# Patient Record
Sex: FEMALE | Race: WHITE | NOT HISPANIC OR LATINO | Employment: PART TIME | URBAN - METROPOLITAN AREA
[De-identification: names, ages, dates, MRNs, and addresses within clinical notes are randomized per-mention and may not be internally consistent; named-entity substitution may affect disease eponyms.]

---

## 2017-02-16 ENCOUNTER — TELEPHONE (OUTPATIENT)
Dept: FAMILY MEDICINE | Age: 57
End: 2017-02-16

## 2017-02-24 ENCOUNTER — TELEPHONE (OUTPATIENT)
Dept: FAMILY MEDICINE | Age: 57
End: 2017-02-24

## 2017-02-27 ENCOUNTER — OFFICE VISIT (OUTPATIENT)
Dept: OBGYN | Age: 57
End: 2017-02-27

## 2017-02-27 ENCOUNTER — OFF PREMISE (OUTPATIENT)
Dept: OTHER | Age: 57
End: 2017-02-27

## 2017-02-27 VITALS
WEIGHT: 125 LBS | SYSTOLIC BLOOD PRESSURE: 88 MMHG | BODY MASS INDEX: 21.34 KG/M2 | DIASTOLIC BLOOD PRESSURE: 52 MMHG | HEIGHT: 64 IN

## 2017-02-27 DIAGNOSIS — Z01.419 WELL WOMAN EXAM WITH ROUTINE GYNECOLOGICAL EXAM: Primary | ICD-10-CM

## 2017-02-27 PROCEDURE — 99396 PREV VISIT EST AGE 40-64: CPT | Performed by: OBSTETRICS & GYNECOLOGY

## 2017-03-09 ENCOUNTER — OFFICE VISIT (OUTPATIENT)
Dept: NEUROLOGY | Age: 57
End: 2017-03-09

## 2017-03-09 VITALS
DIASTOLIC BLOOD PRESSURE: 60 MMHG | WEIGHT: 119.05 LBS | HEART RATE: 72 BPM | SYSTOLIC BLOOD PRESSURE: 100 MMHG | HEIGHT: 63 IN | BODY MASS INDEX: 21.09 KG/M2

## 2017-03-09 DIAGNOSIS — G25.0 BENIGN ESSENTIAL TREMOR: Primary | ICD-10-CM

## 2017-03-09 DIAGNOSIS — R25.1 TREMORS OF NERVOUS SYSTEM: ICD-10-CM

## 2017-03-09 PROCEDURE — 96127 BRIEF EMOTIONAL/BEHAV ASSMT: CPT | Performed by: PSYCHIATRY & NEUROLOGY

## 2017-03-09 PROCEDURE — 99245 OFF/OP CONSLTJ NEW/EST HI 55: CPT | Performed by: PSYCHIATRY & NEUROLOGY

## 2018-03-05 ENCOUNTER — LAB SERVICES (OUTPATIENT)
Dept: LAB | Age: 58
End: 2018-03-05

## 2018-03-05 ENCOUNTER — OFF PREMISE (OUTPATIENT)
Dept: OTHER | Age: 58
End: 2018-03-05

## 2018-03-05 ENCOUNTER — OFFICE VISIT (OUTPATIENT)
Dept: OBGYN | Age: 58
End: 2018-03-05

## 2018-03-05 VITALS
SYSTOLIC BLOOD PRESSURE: 100 MMHG | WEIGHT: 124.12 LBS | HEIGHT: 63 IN | BODY MASS INDEX: 21.99 KG/M2 | DIASTOLIC BLOOD PRESSURE: 74 MMHG

## 2018-03-05 DIAGNOSIS — Z01.419 WELL WOMAN EXAM WITH ROUTINE GYNECOLOGICAL EXAM: Primary | ICD-10-CM

## 2018-03-05 DIAGNOSIS — Z11.59 ENCOUNTER FOR HEPATITIS C SCREENING TEST FOR LOW RISK PATIENT: ICD-10-CM

## 2018-03-05 DIAGNOSIS — Z12.31 ENCOUNTER FOR SCREENING MAMMOGRAM FOR MALIGNANT NEOPLASM OF BREAST: ICD-10-CM

## 2018-03-05 LAB — HCV AB SER QL: NEGATIVE

## 2018-03-05 PROCEDURE — 86803 HEPATITIS C AB TEST: CPT | Performed by: INTERNAL MEDICINE

## 2018-03-05 PROCEDURE — 99396 PREV VISIT EST AGE 40-64: CPT | Performed by: OBSTETRICS & GYNECOLOGY

## 2018-03-05 PROCEDURE — 36415 COLL VENOUS BLD VENIPUNCTURE: CPT | Performed by: INTERNAL MEDICINE

## 2018-03-06 ENCOUNTER — TELEPHONE (OUTPATIENT)
Dept: OBGYN | Age: 58
End: 2018-03-06

## 2018-04-05 ENCOUNTER — TELEPHONE (OUTPATIENT)
Dept: FAMILY MEDICINE | Age: 58
End: 2018-04-05

## 2019-01-01 ENCOUNTER — EXTERNAL RECORD (OUTPATIENT)
Dept: OTHER | Age: 59
End: 2019-01-01

## 2019-03-05 ENCOUNTER — NURSE TRIAGE (OUTPATIENT)
Dept: FAMILY MEDICINE | Age: 59
End: 2019-03-05

## 2019-04-18 ENCOUNTER — OFFICE VISIT (OUTPATIENT)
Dept: OBGYN | Age: 59
End: 2019-04-18

## 2019-04-18 VITALS
SYSTOLIC BLOOD PRESSURE: 102 MMHG | BODY MASS INDEX: 21.57 KG/M2 | DIASTOLIC BLOOD PRESSURE: 58 MMHG | WEIGHT: 126.32 LBS | HEIGHT: 64 IN

## 2019-04-18 DIAGNOSIS — Z01.419 WELL WOMAN EXAM WITH ROUTINE GYNECOLOGICAL EXAM: Primary | ICD-10-CM

## 2019-04-18 DIAGNOSIS — Z12.4 PAP SMEAR FOR CERVICAL CANCER SCREENING: ICD-10-CM

## 2019-04-18 DIAGNOSIS — Z12.11 COLON CANCER SCREENING: ICD-10-CM

## 2019-04-18 PROCEDURE — 88175 CYTOPATH C/V AUTO FLUID REDO: CPT | Performed by: INTERNAL MEDICINE

## 2019-04-18 PROCEDURE — 87624 HPV HI-RISK TYP POOLED RSLT: CPT | Performed by: INTERNAL MEDICINE

## 2019-04-18 PROCEDURE — 99396 PREV VISIT EST AGE 40-64: CPT | Performed by: OBSTETRICS & GYNECOLOGY

## 2019-04-18 RX ORDER — COVID-19 ANTIGEN TEST
220 KIT MISCELLANEOUS 2 TIMES DAILY WITH MEALS
COMMUNITY

## 2019-04-18 ASSESSMENT — PATIENT HEALTH QUESTIONNAIRE - PHQ9
SUM OF ALL RESPONSES TO PHQ9 QUESTIONS 1 AND 2: 0
1. LITTLE INTEREST OR PLEASURE IN DOING THINGS: NOT AT ALL
2. FEELING DOWN, DEPRESSED OR HOPELESS: NOT AT ALL
SUM OF ALL RESPONSES TO PHQ9 QUESTIONS 1 AND 2: 0

## 2019-04-26 LAB — HPV16+18+45 E6+E7MRNA CVX NAA+PROBE: NORMAL

## 2021-02-25 ENCOUNTER — TELEPHONE (OUTPATIENT)
Dept: FAMILY MEDICINE | Age: 61
End: 2021-02-25

## 2024-07-10 ENCOUNTER — TELEPHONE (OUTPATIENT)
Dept: TRANSPLANT | Facility: CLINIC | Age: 64
End: 2024-07-10

## 2024-07-10 NOTE — TELEPHONE ENCOUNTER
----- Message from Marie Abreu sent at 7/10/2024  5:28 PM CDT -----    Hepatology referral received and scanned into media; pt chart sent to referral nurse for medical review.       Referring Provider: Sj Novoa MD  Phone: [phone]  Fax: [phone]  .

## 2024-07-12 ENCOUNTER — TELEPHONE (OUTPATIENT)
Dept: TRANSPLANT | Facility: CLINIC | Age: 64
End: 2024-07-12
Payer: COMMERCIAL

## 2024-07-12 NOTE — TELEPHONE ENCOUNTER
Cirrhosis with Referral received from Dr Sj Novoa MD    Patient with Cirrhosis unknown  MELD unknown   ICD-10:  K74.60   Referred for liver transplant for CONSULT   Referral completed and forwarded to [facility].          Insurance:   PRIMARY:   ID:  Contact #     SECONDARY:   ID:  Contact #

## 2024-07-18 ENCOUNTER — TELEPHONE (OUTPATIENT)
Dept: TRANSPLANT | Facility: CLINIC | Age: 64
End: 2024-07-18
Payer: COMMERCIAL

## 2024-07-18 NOTE — TELEPHONE ENCOUNTER
----- Message from Marie Abreu sent at 7/18/2024 11:56 AM CDT -----    Called [phone] (Home Phone) and sp to pt; appt lucien'ed for: 8/12  .

## 2024-08-07 DIAGNOSIS — K74.60 HEPATIC CIRRHOSIS, UNSPECIFIED HEPATIC CIRRHOSIS TYPE, UNSPECIFIED WHETHER ASCITES PRESENT: Primary | ICD-10-CM

## 2024-08-07 DIAGNOSIS — Z76.82 ORGAN TRANSPLANT CANDIDATE: ICD-10-CM

## 2024-08-07 RX ORDER — BUDESONIDE AND FORMOTEROL FUMARATE DIHYDRATE 160; 4.5 UG/1; UG/1
AEROSOL RESPIRATORY (INHALATION)
COMMUNITY
Start: 2024-02-26

## 2024-08-07 RX ORDER — ALBUTEROL SULFATE 90 UG/1
2 INHALANT RESPIRATORY (INHALATION) 4 TIMES DAILY PRN
COMMUNITY
Start: 2024-07-15

## 2024-08-07 RX ORDER — SPIRONOLACTONE 50 MG/1
50 TABLET, FILM COATED ORAL 2 TIMES DAILY
COMMUNITY
Start: 2024-07-15

## 2024-08-07 RX ORDER — PANTOPRAZOLE SODIUM 40 MG/1
40 TABLET, DELAYED RELEASE ORAL NIGHTLY
COMMUNITY
Start: 2024-07-15

## 2024-08-12 ENCOUNTER — OFFICE VISIT (OUTPATIENT)
Dept: TRANSPLANT | Facility: CLINIC | Age: 64
End: 2024-08-12
Payer: COMMERCIAL

## 2024-08-12 ENCOUNTER — LAB VISIT (OUTPATIENT)
Dept: LAB | Facility: HOSPITAL | Age: 64
End: 2024-08-12
Payer: COMMERCIAL

## 2024-08-12 VITALS
BODY MASS INDEX: 18.03 KG/M2 | TEMPERATURE: 97 F | HEART RATE: 79 BPM | OXYGEN SATURATION: 100 % | DIASTOLIC BLOOD PRESSURE: 61 MMHG | HEIGHT: 64 IN | WEIGHT: 105.63 LBS | RESPIRATION RATE: 16 BRPM | SYSTOLIC BLOOD PRESSURE: 101 MMHG

## 2024-08-12 DIAGNOSIS — Z76.82 ORGAN TRANSPLANT CANDIDATE: ICD-10-CM

## 2024-08-12 DIAGNOSIS — R18.8 OTHER ASCITES: ICD-10-CM

## 2024-08-12 DIAGNOSIS — E88.01 EMPHYSEMA DUE TO ALPHA-1-ANTITRYPSIN DEFICIENCY: ICD-10-CM

## 2024-08-12 DIAGNOSIS — J43.8 EMPHYSEMA DUE TO ALPHA-1-ANTITRYPSIN DEFICIENCY: ICD-10-CM

## 2024-08-12 DIAGNOSIS — E80.6 HYPERBILIRUBINEMIA: ICD-10-CM

## 2024-08-12 DIAGNOSIS — I85.11 SECONDARY ESOPHAGEAL VARICES WITH BLEEDING: ICD-10-CM

## 2024-08-12 DIAGNOSIS — K74.69 DECOMPENSATED LIVER DISEASE: ICD-10-CM

## 2024-08-12 DIAGNOSIS — E88.09 ALPHA-1-ANTICHYMOTRYPSIN DEFICIENCY: ICD-10-CM

## 2024-08-12 DIAGNOSIS — M62.84 SARCOPENIA: ICD-10-CM

## 2024-08-12 DIAGNOSIS — K74.60 HEPATIC CIRRHOSIS, UNSPECIFIED HEPATIC CIRRHOSIS TYPE, UNSPECIFIED WHETHER ASCITES PRESENT: ICD-10-CM

## 2024-08-12 DIAGNOSIS — Z83.49 FAMILY HISTORY OF ALPHA 1 ANTITRYPSIN DEFICIENCY: ICD-10-CM

## 2024-08-12 DIAGNOSIS — K76.6 PORTAL HYPERTENSION: ICD-10-CM

## 2024-08-12 PROBLEM — K72.90 DECOMPENSATED LIVER DISEASE: Status: ACTIVE | Noted: 2024-08-12

## 2024-08-12 LAB
AFP SERPL-MCNC: 3.7 NG/ML (ref 0–8.4)
ALBUMIN SERPL BCP-MCNC: 3.5 G/DL (ref 3.5–5.2)
ALP SERPL-CCNC: 319 U/L (ref 55–135)
ALT SERPL W/O P-5'-P-CCNC: 54 U/L (ref 10–44)
AMPHET+METHAMPHET UR QL: NEGATIVE
ANION GAP SERPL CALC-SCNC: 7 MMOL/L (ref 8–16)
AST SERPL-CCNC: 70 U/L (ref 10–40)
BARBITURATES UR QL SCN>200 NG/ML: NEGATIVE
BASOPHILS # BLD AUTO: 0.02 K/UL (ref 0–0.2)
BASOPHILS NFR BLD: 0.4 % (ref 0–1.9)
BENZODIAZ UR QL SCN>200 NG/ML: NEGATIVE
BILIRUB DIRECT SERPL-MCNC: 1.1 MG/DL (ref 0.1–0.3)
BILIRUB SERPL-MCNC: 2.6 MG/DL (ref 0.1–1)
BUN SERPL-MCNC: 20 MG/DL (ref 8–23)
BZE UR QL SCN: NEGATIVE
CALCIUM SERPL-MCNC: 9.9 MG/DL (ref 8.7–10.5)
CANNABINOIDS UR QL SCN: NEGATIVE
CHLORIDE SERPL-SCNC: 103 MMOL/L (ref 95–110)
CO2 SERPL-SCNC: 23 MMOL/L (ref 23–29)
CREAT SERPL-MCNC: 0.9 MG/DL (ref 0.5–1.4)
CREAT UR-MCNC: 179 MG/DL (ref 15–325)
DIFFERENTIAL METHOD BLD: ABNORMAL
EOSINOPHIL # BLD AUTO: 0 K/UL (ref 0–0.5)
EOSINOPHIL NFR BLD: 0.6 % (ref 0–8)
ERYTHROCYTE [DISTWIDTH] IN BLOOD BY AUTOMATED COUNT: 15.5 % (ref 11.5–14.5)
EST. GFR  (NO RACE VARIABLE): >60 ML/MIN/1.73 M^2
ETHANOL UR-MCNC: <10 MG/DL
GGT SERPL-CCNC: 236 U/L (ref 8–55)
GLUCOSE SERPL-MCNC: 130 MG/DL (ref 70–110)
HAV IGG SER QL IA: NORMAL
HBV CORE AB SERPL QL IA: NORMAL
HBV SURFACE AB SER-ACNC: <3 MIU/ML
HBV SURFACE AB SER-ACNC: NORMAL M[IU]/ML
HBV SURFACE AG SERPL QL IA: NORMAL
HCT VFR BLD AUTO: 40.9 % (ref 37–48.5)
HCV AB SERPL QL IA: NORMAL
HGB BLD-MCNC: 13.3 G/DL (ref 12–16)
IMM GRANULOCYTES # BLD AUTO: 0.01 K/UL (ref 0–0.04)
IMM GRANULOCYTES NFR BLD AUTO: 0.2 % (ref 0–0.5)
INR PPP: 1 (ref 0.8–1.2)
LYMPHOCYTES # BLD AUTO: 0.7 K/UL (ref 1–4.8)
LYMPHOCYTES NFR BLD: 15.2 % (ref 18–48)
MCH RBC QN AUTO: 33.3 PG (ref 27–31)
MCHC RBC AUTO-ENTMCNC: 32.5 G/DL (ref 32–36)
MCV RBC AUTO: 103 FL (ref 82–98)
METHADONE UR QL SCN>300 NG/ML: NEGATIVE
MONOCYTES # BLD AUTO: 0.4 K/UL (ref 0.3–1)
MONOCYTES NFR BLD: 8 % (ref 4–15)
NEUTROPHILS # BLD AUTO: 3.5 K/UL (ref 1.8–7.7)
NEUTROPHILS NFR BLD: 75.6 % (ref 38–73)
NRBC BLD-RTO: 0 /100 WBC
OPIATES UR QL SCN: NEGATIVE
PCP UR QL SCN>25 NG/ML: NEGATIVE
PLATELET # BLD AUTO: 218 K/UL (ref 150–450)
PMV BLD AUTO: 9 FL (ref 9.2–12.9)
POTASSIUM SERPL-SCNC: 3.8 MMOL/L (ref 3.5–5.1)
PROT SERPL-MCNC: 7.7 G/DL (ref 6–8.4)
PROTHROMBIN TIME: 11.4 SEC (ref 9–12.5)
RBC # BLD AUTO: 3.99 M/UL (ref 4–5.4)
SODIUM SERPL-SCNC: 133 MMOL/L (ref 136–145)
TOXICOLOGY INFORMATION: NORMAL
WBC # BLD AUTO: 4.62 K/UL (ref 3.9–12.7)

## 2024-08-12 PROCEDURE — 82105 ALPHA-FETOPROTEIN SERUM: CPT | Mod: TXP | Performed by: INTERNAL MEDICINE

## 2024-08-12 PROCEDURE — 82248 BILIRUBIN DIRECT: CPT | Mod: TXP | Performed by: INTERNAL MEDICINE

## 2024-08-12 PROCEDURE — 99999 PR PBB SHADOW E&M-EST. PATIENT-LVL III: CPT | Mod: PBBFAC,TXP,, | Performed by: INTERNAL MEDICINE

## 2024-08-12 PROCEDURE — 99205 OFFICE O/P NEW HI 60 MIN: CPT | Mod: S$PBB,TXP,, | Performed by: INTERNAL MEDICINE

## 2024-08-12 PROCEDURE — 87340 HEPATITIS B SURFACE AG IA: CPT | Mod: TXP | Performed by: INTERNAL MEDICINE

## 2024-08-12 PROCEDURE — 36415 COLL VENOUS BLD VENIPUNCTURE: CPT | Mod: TXP | Performed by: INTERNAL MEDICINE

## 2024-08-12 PROCEDURE — 86704 HEP B CORE ANTIBODY TOTAL: CPT | Mod: TXP | Performed by: INTERNAL MEDICINE

## 2024-08-12 PROCEDURE — 86803 HEPATITIS C AB TEST: CPT | Mod: TXP | Performed by: INTERNAL MEDICINE

## 2024-08-12 PROCEDURE — 80307 DRUG TEST PRSMV CHEM ANLYZR: CPT | Mod: TXP | Performed by: INTERNAL MEDICINE

## 2024-08-12 PROCEDURE — 85025 COMPLETE CBC W/AUTO DIFF WBC: CPT | Mod: TXP | Performed by: INTERNAL MEDICINE

## 2024-08-12 PROCEDURE — 82977 ASSAY OF GGT: CPT | Mod: TXP | Performed by: INTERNAL MEDICINE

## 2024-08-12 PROCEDURE — 86706 HEP B SURFACE ANTIBODY: CPT | Mod: 91,TXP | Performed by: INTERNAL MEDICINE

## 2024-08-12 PROCEDURE — 80053 COMPREHEN METABOLIC PANEL: CPT | Mod: TXP | Performed by: INTERNAL MEDICINE

## 2024-08-12 PROCEDURE — 86790 VIRUS ANTIBODY NOS: CPT | Mod: TXP | Performed by: INTERNAL MEDICINE

## 2024-08-12 PROCEDURE — 85610 PROTHROMBIN TIME: CPT | Mod: TXP | Performed by: INTERNAL MEDICINE

## 2024-08-12 NOTE — PROGRESS NOTES
Transplant Hepatology   Transplant Evaluation Consult Note    Referring provider: Aaareferral Self  PCP: No, Primary Doctor    Chief complaint: A1AT deficiency cirrhosis, transplant evaluation    HPI: Ami Austin is a 63 y.o. female who was referred to Transplant Hepatology Clinic for A1AT deficiency related cirrhosis. She is accompanied by her , Eric. Notes being diagnosed with A1AT phenotype ZZ when she was 28 years old. Diagnosed with decompensated cirrhosis and emphysema in 2024.     Regarding complications of liver disease, she notes being diagnosed with ascites. She takes Aldactone 50mg BID. Underwent paracentesis with ~5L of ascites removed in 2024. Adheres to low sodium diet. Has EV bleeding in 2024 treated with esophageal banding. Scheduled for repeat EGD in . Denies prior episodes of jaundice. Denies prior diagnosis of hepatic encephalopathy.     Regarding other risk factors of liver disease, alcohol use, blood transfusion, tattoo, drug use use. MASLD risk factors: denies obesity, HTN, HLD, DM, BETTY.     Notes A1AT deficiency runs in her family. Sister has received 2 liver transplants in [place]. Brother and father have  from complications of A1AT deficiency.     In , underwent exploration of the abdomen, R salpingo-oophorectomy for mucinous cyst of the R ovary, left ovarian cystectomy, appendectomy. In , underwent bowel resection for Meckel's diverticulum. Denies upper abdominal surgeries.    Has been diagnosed with emphysema. Does not require home oxygen. Never smoker.     Denies prior MI. Denies prior diagnosis of cancer.     Lives in [place], [place] since . Originally from [place].  x34 years. Has an adopted son age 28. 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     Past Medical History:   Diagnosis Date    Alpha-1-antitrypsin deficiency     Ascites     Emphysema, unspecified     Esophageal varices     Portal hypertension     Splenomegaly     Unspecified  "cirrhosis of liver        Past Surgical History:   Procedure Laterality Date    ESOPHAGOGASTRODUODENOSCOPY  06/18/2024    Grade III esophageal varices.    MECKEL DIVERTICULUM EXCISION      OVARIAN CYST REMOVAL         No family history on file.    Social History     Tobacco Use    Smoking status: Never   Substance Use Topics    Alcohol use: Not Currently       Current Outpatient Medications   Medication Sig Dispense Refill    albuterol (PROVENTIL/VENTOLIN HFA) 90 mcg/actuation inhaler 2 puffs 4 (four) times daily as needed.      pantoprazole (PROTONIX) 40 MG tablet Take 40 mg by mouth every evening.      spironolactone (ALDACTONE) 50 MG tablet Take 50 mg by mouth 2 (two) times daily.      SYMBICORT 160-4.5 mcg/actuation HFAA Inhale 2 puffs into the lungs every 12 (twelve) hours.       No current facility-administered medications for this visit.       Review of patient's allergies indicates:   Allergen Reactions    Ephedrine Other (See Comments)     Hyperexcitability    House dust mite     Egg derived Hives and Nausea And Vomiting            Vitals:    08/12/24 0854   BP: 101/61   Pulse: 79   Resp: 16   Temp: 97.3 °F (36.3 °C)   TempSrc: Temporal   SpO2: 100%   Weight: 47.9 kg (105 lb 9.6 oz)   Height: 5' 3.5" (1.613 m)   Body mass index is 18.41 kg/m².    Physical Exam  Constitutional:       General: She is not in acute distress.     Appearance: She is underweight.   Eyes:      General: No scleral icterus.  Cardiovascular:      Rate and Rhythm: Normal rate.   Pulmonary:      Effort: Pulmonary effort is normal.   Abdominal:      General: Abdomen is protuberant. There is no distension.      Palpations: Abdomen is soft. There is fluid wave.      Tenderness: There is no abdominal tenderness.      Hernia: A hernia is present. Hernia is present in the umbilical area.   Musculoskeletal:      Right lower leg: No edema.      Left lower leg: No edema.   Skin:     General: Skin is warm.      Coloration: Skin is not jaundiced. "      Comments: No spider angiomas. No palmar erythema. No leukonychia.    Neurological:      General: No focal deficit present.      Mental Status: She is alert and oriented to person, place, and time.      Comments: No asterixis.   Psychiatric:         Behavior: Behavior is cooperative.         Thought Content: Thought content normal.         LABS: I personally reviewed pertinent laboratory findings.    Lab Results   Component Value Date    ALT 54 (H) 08/12/2024    AST 70 (H) 08/12/2024     (H) 08/12/2024    ALKPHOS 319 (H) 08/12/2024    BILITOT 2.6 (H) 08/12/2024       Lab Results   Component Value Date    WBC 4.62 08/12/2024    HGB 13.3 08/12/2024    HCT 40.9 08/12/2024     (H) 08/12/2024     08/12/2024       Lab Results   Component Value Date     (L) 08/12/2024    K 3.8 08/12/2024     08/12/2024    CO2 23 08/12/2024    BUN 20 08/12/2024    CREATININE 0.9 08/12/2024    CALCIUM 9.9 08/12/2024    ANIONGAP 7 (L) 08/12/2024       Lab Results   Component Value Date    INR 1.0 08/12/2024       Imaging:  I personally reviewed recent imaging studies available on the chart and from outside medical records.  CT A/P with IV contrast 7/10/2024 (media tab)      EGD 6/18/2024 (media tab)      Assessment:  63 y.o. female with A1AT deficiency related cirrhosis. She is decompensated with ascites, history of variceal bleeding.    1. Decompensated liver disease    2. Alpha-1-antichymotrypsin deficiency    3. Portal hypertension    4. Other ascites    5. Secondary esophageal varices with bleeding    6. Hyperbilirubinemia    7. Sarcopenia    8. Emphysema due to alpha-1-antitrypsin deficiency    9. Family history of alpha 1 antitrypsin deficiency    10. Organ transplant candidate        MELD 3.0: 14 at 8/12/2024  7:43 AM  MELD-Na: 10 at 8/12/2024  7:43 AM  Calculated from:  Serum Creatinine: 0.9 mg/dL (Using min of 1 mg/dL) at 8/12/2024  7:43 AM  Serum Sodium: 133 mmol/L at 8/12/2024  7:43 AM  Total  Bilirubin: 2.6 mg/dL at 8/12/2024  7:43 AM  Serum Albumin: 3.5 g/dL at 8/12/2024  7:43 AM  INR(ratio): 1.0 at 8/12/2024  7:43 AM  Age at listing (hypothetical): 63 years  Sex: Female at 8/12/2024  7:43 AM      Transplant Candidacy: Patient is a 63 y.o. female with A1AT deficiency related cirrhosis referred for evaluation for possible OLT. MELD 3.0 14. Based on available information, she is a potential liver transplant candidate. However, I am concerned about her cachexia / sarcopenia. I recommend working on improving nutritional status prior to proceeding with liver transplant evaluation. I have provided counseling regarding ways to optimize her weight. Will plan to reassess her nutritional status in 6-8 weeks, and will decide regarding transplant evaluation at that time.     Recommendations:  Ascites/Edema:  Ascites+. Aldactone 50mg BID. Has requiring 1 paracentesis in June 2024. LVP as needed. Na 2 g diet.    Encephalopathy: Not an active issue.    Variceal screening: Reports EV bleeding in June 2024 treated with esophageal banding. Scheduled for repeat EGD in 2024.    HCC screening: Last contrasted CT without mention of liver lesions (media tab, 7/10/24). AFP 3.7 (8/12/2024). Repeat abdominal US and AFP every 6 months.    Immunizations: Not immune to HAV or HBV. Recommend vaccination for both    CRC screening: Timing of last colonoscopy is unknown.    Sarcopenia: BMI 18. This is main barrier to liver transplant at this time. Counseled regarding high protein diet, protein shakes, bedtime snack.     Emphysema: Followed by local Pulmonologist. Does not require home oxygen at this time.    Living Donation: Does not identify potential living donors at this time. Encouraged to explore living donation giving low MELD.    UNOS Patient Status  Functional Status: 50% - Requires considerable assistance and frequent medical care  Physical Capacity: Limited Mobility    Diabetes: No  Any previous malignancy: No  Neoadjuvant  Therapy: no  Has patient ever had a dx of HCC: no  Previous Abdominal Surgery: yes  Spontaneous Bacterial Peritonitis: no  History of Portal Vein Thrombosis: no  Transjugular Intrahepatic Portosystemic Shunt: no    Return to clinic in 2 months.    I have sent communication to the referring physician and/or primary care provider.    I spent a total of 60 minutes on the day of the visit. This includes face to face time and non-face to face time preparing to see the patient (eg, review of tests), obtaining and/or reviewing separately obtained history, documenting clinical information in the electronic or other health record, independently interpreting results, and communicating results to the patient/family/caregiver, or care coordination.    Madelin Napier MD  Staff Physician  Hepatology and Liver Transplant  [facility]  [facility] [place]

## 2024-08-12 NOTE — Clinical Note
Timing for liver transplant eval TBD. Patient needs to optimize nutritional status. Return to Transplant Clinic in 6-8 weeks for reassessment.

## 2024-08-12 NOTE — LETTER
August 12, 2024        Sj Novoa  [address] [facility] [address]  Phone: [phone]  Fax: [phone]             [facility]  [address]  [facility] [address]  Phone: [phone]   Patient: Ami Austin   MR Number: [id number]   YOB: 1960   Date of Visit: 8/12/2024       Dear Dr. Sj Novoa    Thank you for referring Ami Austin to me for evaluation. Attached you will find relevant portions of my assessment and plan of care.    If you have questions, please do not hesitate to call me. I look forward to following Ami Austin along with you.    Sincerely,    Madelin Napier MD    Enclosure    If you would like to receive this communication electronically, please contact [email or web address] or [phone] to request [employer] Link access.    [employer] Link is a tool which provides read-only access to select patient information with whom you have a relationship. Its easy to use and provides real time access to review your patients record including encounter summaries, notes, results, and demographic information.    If you feel you have received this communication in error or would no longer like to receive these types of communications, please e-mail [email or web address]

## 2024-09-10 ENCOUNTER — TELEPHONE (OUTPATIENT)
Dept: TRANSPLANT | Facility: CLINIC | Age: 64
End: 2024-09-10
Payer: COMMERCIAL

## 2024-09-10 NOTE — TELEPHONE ENCOUNTER
Received call back from patient.  She reports that she received call back from Dr. Novoa's office.  Patient scheduled for clinic appt tomorrow.  At that time, paracentesis will be scheduled for first available.  Patient reports that she no longer needs assistance w/ scheduling outpatient daniela.

## 2024-09-10 NOTE — TELEPHONE ENCOUNTER
Patient's phone call returned.  Discussed the following:  Patient requesting a referral to a local Hepatologist to manage liver disease.  Patient reports that she needs dental work and dentist asking for guidance on anes/ meds given liver disease.  Informed patient that this can be addressed w/ Dr. Napier.  She reports that correspondence will be sent today.  Patient confirms that she has a local GI doctor.  Informed her of the importance of identifying a PCP.    Patient reports that she is having difficulty scheduling paracentesis locally.  Messages left w/ Dr. Novoa's office but nothing scheduled outpatient as of now.  Patient reports that she is trying to avoid going to the ED.  Will attempt w/ scheduling para locally.    Patient denies the need for any further assistance at this time.  Will f/u accordingly.     ========================================================================================      ----- Message from Nidia Lazo MA sent at 9/10/2024  9:22 AM CDT -----    ----- Message -----  From: Prema Ling  Sent: 9/10/2024   9:07 AM CDT  To: Karthikeyan Yusuf Staff    Type:  Patient Requesting Referral    Who Called: Pt   Does the patient already have the specialty appointment scheduled?: No   Referral to What Specialty: Liver specialist within [place]   Reason for Referral: Receive care where she lives   Does the patient want the referral with a specific physician?:   [facility] the specialist [facility]?: [facility]   Patient Requesting a Response?: Yes   Would the patient rather a call back or a response via [facility]? Call back   Best Call Back Number: [phone]

## 2024-09-13 ENCOUNTER — TELEPHONE (OUTPATIENT)
Dept: TRANSPLANT | Facility: CLINIC | Age: 64
End: 2024-09-13
Payer: COMMERCIAL

## 2024-09-13 NOTE — TELEPHONE ENCOUNTER
Called patient to f/u on previous call regarding forms to be filled out for dental work.  No answer at this time.  Message left on  informing her that form completed and faxed back to [facility] (fax confirmation received).  Return phone call requested for any questions/ concerns.

## 2024-10-24 DIAGNOSIS — K74.69 DECOMPENSATED LIVER DISEASE: ICD-10-CM

## 2024-10-24 DIAGNOSIS — Z76.82 ORGAN TRANSPLANT CANDIDATE: Primary | ICD-10-CM

## 2024-10-25 ENCOUNTER — OFFICE VISIT (OUTPATIENT)
Dept: TRANSPLANT | Facility: CLINIC | Age: 64
End: 2024-10-25
Payer: COMMERCIAL

## 2024-10-25 ENCOUNTER — LAB VISIT (OUTPATIENT)
Dept: LAB | Facility: HOSPITAL | Age: 64
End: 2024-10-25
Attending: INTERNAL MEDICINE
Payer: COMMERCIAL

## 2024-10-25 VITALS
RESPIRATION RATE: 18 BRPM | SYSTOLIC BLOOD PRESSURE: 101 MMHG | TEMPERATURE: 98 F | HEIGHT: 63 IN | DIASTOLIC BLOOD PRESSURE: 60 MMHG | BODY MASS INDEX: 19.49 KG/M2 | WEIGHT: 110 LBS | HEART RATE: 109 BPM | OXYGEN SATURATION: 94 %

## 2024-10-25 DIAGNOSIS — E88.01 EMPHYSEMA DUE TO ALPHA-1-ANTITRYPSIN DEFICIENCY: ICD-10-CM

## 2024-10-25 DIAGNOSIS — Z76.82 ORGAN TRANSPLANT CANDIDATE: ICD-10-CM

## 2024-10-25 DIAGNOSIS — K74.69 DECOMPENSATED LIVER DISEASE: ICD-10-CM

## 2024-10-25 DIAGNOSIS — E80.6 HYPERBILIRUBINEMIA: ICD-10-CM

## 2024-10-25 DIAGNOSIS — R18.8 OTHER ASCITES: ICD-10-CM

## 2024-10-25 DIAGNOSIS — K42.9 UMBILICAL HERNIA WITHOUT OBSTRUCTION AND WITHOUT GANGRENE: ICD-10-CM

## 2024-10-25 DIAGNOSIS — J43.8 EMPHYSEMA DUE TO ALPHA-1-ANTITRYPSIN DEFICIENCY: ICD-10-CM

## 2024-10-25 DIAGNOSIS — K76.6 PORTAL HYPERTENSION: ICD-10-CM

## 2024-10-25 DIAGNOSIS — Z83.49 FAMILY HISTORY OF ALPHA 1 ANTITRYPSIN DEFICIENCY: ICD-10-CM

## 2024-10-25 DIAGNOSIS — I85.11 SECONDARY ESOPHAGEAL VARICES WITH BLEEDING: ICD-10-CM

## 2024-10-25 DIAGNOSIS — E88.09 ALPHA-1-ANTICHYMOTRYPSIN DEFICIENCY: ICD-10-CM

## 2024-10-25 DIAGNOSIS — M62.84 SARCOPENIA: ICD-10-CM

## 2024-10-25 LAB
ALBUMIN SERPL BCP-MCNC: 3.2 G/DL (ref 3.5–5.2)
ALP SERPL-CCNC: 267 U/L (ref 40–150)
ALT SERPL W/O P-5'-P-CCNC: 43 U/L (ref 10–44)
ANION GAP SERPL CALC-SCNC: 9 MMOL/L (ref 8–16)
AST SERPL-CCNC: 54 U/L (ref 10–40)
BASOPHILS # BLD AUTO: 0.04 K/UL (ref 0–0.2)
BASOPHILS NFR BLD: 0.6 % (ref 0–1.9)
BILIRUB SERPL-MCNC: 2.3 MG/DL (ref 0.1–1)
BUN SERPL-MCNC: 29 MG/DL (ref 8–23)
CALCIUM SERPL-MCNC: 9.2 MG/DL (ref 8.7–10.5)
CHLORIDE SERPL-SCNC: 103 MMOL/L (ref 95–110)
CO2 SERPL-SCNC: 26 MMOL/L (ref 23–29)
CREAT SERPL-MCNC: 0.8 MG/DL (ref 0.5–1.4)
DIFFERENTIAL METHOD BLD: ABNORMAL
EOSINOPHIL # BLD AUTO: 0 K/UL (ref 0–0.5)
EOSINOPHIL NFR BLD: 0.6 % (ref 0–8)
ERYTHROCYTE [DISTWIDTH] IN BLOOD BY AUTOMATED COUNT: 15.6 % (ref 11.5–14.5)
EST. GFR  (NO RACE VARIABLE): >60 ML/MIN/1.73 M^2
GLUCOSE SERPL-MCNC: 129 MG/DL (ref 70–110)
HCT VFR BLD AUTO: 38.4 % (ref 37–48.5)
HGB BLD-MCNC: 12.6 G/DL (ref 12–16)
IMM GRANULOCYTES # BLD AUTO: 0.02 K/UL (ref 0–0.04)
IMM GRANULOCYTES NFR BLD AUTO: 0.3 % (ref 0–0.5)
INR PPP: 1 (ref 0.8–1.2)
LYMPHOCYTES # BLD AUTO: 1 K/UL (ref 1–4.8)
LYMPHOCYTES NFR BLD: 14.4 % (ref 18–48)
MCH RBC QN AUTO: 33.7 PG (ref 27–31)
MCHC RBC AUTO-ENTMCNC: 32.8 G/DL (ref 32–36)
MCV RBC AUTO: 103 FL (ref 82–98)
MONOCYTES # BLD AUTO: 0.6 K/UL (ref 0.3–1)
MONOCYTES NFR BLD: 8.7 % (ref 4–15)
NEUTROPHILS # BLD AUTO: 5 K/UL (ref 1.8–7.7)
NEUTROPHILS NFR BLD: 75.4 % (ref 38–73)
NRBC BLD-RTO: 0 /100 WBC
PLATELET # BLD AUTO: 208 K/UL (ref 150–450)
PMV BLD AUTO: 8.9 FL (ref 9.2–12.9)
POTASSIUM SERPL-SCNC: 3.3 MMOL/L (ref 3.5–5.1)
PROT SERPL-MCNC: 7.1 G/DL (ref 6–8.4)
PROTHROMBIN TIME: 11.1 SEC (ref 9–12.5)
RBC # BLD AUTO: 3.74 M/UL (ref 4–5.4)
SODIUM SERPL-SCNC: 138 MMOL/L (ref 136–145)
WBC # BLD AUTO: 6.66 K/UL (ref 3.9–12.7)

## 2024-10-25 PROCEDURE — 85025 COMPLETE CBC W/AUTO DIFF WBC: CPT | Mod: TXP | Performed by: INTERNAL MEDICINE

## 2024-10-25 PROCEDURE — 80053 COMPREHEN METABOLIC PANEL: CPT | Mod: TXP | Performed by: INTERNAL MEDICINE

## 2024-10-25 PROCEDURE — 99999 PR PBB SHADOW E&M-EST. PATIENT-LVL III: CPT | Mod: PBBFAC,TXP,, | Performed by: INTERNAL MEDICINE

## 2024-10-25 PROCEDURE — 80321 ALCOHOLS BIOMARKERS 1OR 2: CPT | Mod: TXP | Performed by: INTERNAL MEDICINE

## 2024-10-25 PROCEDURE — 36415 COLL VENOUS BLD VENIPUNCTURE: CPT | Mod: TXP | Performed by: INTERNAL MEDICINE

## 2024-10-25 PROCEDURE — 85610 PROTHROMBIN TIME: CPT | Mod: TXP | Performed by: INTERNAL MEDICINE

## 2024-10-25 RX ORDER — FUROSEMIDE 20 MG/1
20 TABLET ORAL 2 TIMES DAILY
COMMUNITY

## 2024-10-25 RX ORDER — FAMOTIDINE 40 MG/1
40 TABLET, FILM COATED ORAL DAILY
COMMUNITY

## 2024-10-25 NOTE — LETTER
October 25, 2024        Sj Novoa  [address] [facility] [address]  Phone: [phone]  Fax: [phone]             [facility]  [address]  [facility] [address]  Phone: [phone]   Patient: Ami Austin   MR Number: [id number]   YOB: 1960   Date of Visit: 10/25/2024       Dear Dr. Sj Novoa    Thank you for referring Ami Austin to me for evaluation. Attached you will find relevant portions of my assessment and plan of care.    If you have questions, please do not hesitate to call me. I look forward to following Ami Austin along with you.    Sincerely,    Madelin Napier MD    Enclosure    If you would like to receive this communication electronically, please contact [email or web address] or [phone] to request [employer] access.    [employer] is a tool which provides read-only access to select patient information with whom you have a relationship. Its easy to use and provides real time access to review your patients record including encounter summaries, notes, results, and demographic information.    If you feel you have received this communication in error or would no longer like to receive these types of communications, please e-mail [email or web address]

## 2024-10-25 NOTE — PROGRESS NOTES
Transplant Hepatology   Transplant Evaluation Follow Up Note    Referring provider: No ref. provider found  PCP: No, Primary Doctor    Chief complaint: follow up of A1AT deficiency related cirrhosis    HPI: Ami Austin is a 64 y.o. female who presents to Transplant Hepatology Clinic for follow up of A1AT deficiency related cirrhosis. She is accompanied by her , Eric.    Last seen in Transplant Hepatology Clinic on 8/12/2024.    Interval Events:   - Feels ok today.   - Reports having paracentesis with 10L removed. Notes having severe abdominal pain after paracentesis.  - Taking Lasix 20mg qd, Aldactone 50mg qd. Adhering to low salt diet.  - Working on increasing protein intake, but struggles with appetite.  - Denies recent ED visits, hospitalizations.  - She denies signs of encephalopathy, jaundice, GI bleeding.      Past Medical History:   Diagnosis Date    Alpha-1-antitrypsin deficiency     Ascites     Emphysema, unspecified     Esophageal varices     Portal hypertension     Splenomegaly     Unspecified cirrhosis of liver        Past Surgical History:   Procedure Laterality Date    ESOPHAGOGASTRODUODENOSCOPY  06/18/2024    Grade III esophageal varices.    MECKEL DIVERTICULUM EXCISION      OVARIAN CYST REMOVAL         No family history on file.    Social History     Tobacco Use    Smoking status: Never   Substance Use Topics    Alcohol use: Not Currently       Current Outpatient Medications   Medication Sig Dispense Refill    albuterol (PROVENTIL/VENTOLIN HFA) 90 mcg/actuation inhaler 2 puffs 4 (four) times daily as needed.      famotidine (PEPCID) 40 MG tablet Take 40 mg by mouth once daily.      furosemide (LASIX) 20 MG tablet Take 20 mg by mouth 2 (two) times daily.      pantoprazole (PROTONIX) 40 MG tablet Take 40 mg by mouth every evening.      spironolactone (ALDACTONE) 50 MG tablet Take 50 mg by mouth 2 (two) times daily.      SYMBICORT 160-4.5 mcg/actuation HFAA Inhale 2 puffs into the lungs every  "12 (twelve) hours.       No current facility-administered medications for this visit.       Review of patient's allergies indicates:   Allergen Reactions    Ephedrine Other (See Comments)     Hyperexcitability    House dust mite     Egg derived Hives and Nausea And Vomiting            Vitals:    10/25/24 1338   BP: 101/60   Pulse: 109   Resp: 18   Temp: 97.7 °F (36.5 °C)   TempSrc: Tympanic   SpO2: (!) 94%   Weight: 49.9 kg (110 lb 0.2 oz)   Height: 5' 3" (1.6 m)   Body mass index is 19.49 kg/m².    Physical Exam  Constitutional:       General: She is not in acute distress.     Appearance: She is underweight. She is not toxic-appearing.   Eyes:      General: No scleral icterus.  Cardiovascular:      Rate and Rhythm: Tachycardia present.   Pulmonary:      Effort: Pulmonary effort is normal.   Abdominal:      General: Abdomen is protuberant. There is no distension.      Palpations: There is fluid wave.      Tenderness: There is no abdominal tenderness.      Hernia: A hernia is present. Hernia is present in the umbilical area.   Musculoskeletal:         General: No swelling.   Skin:     General: Skin is warm.      Coloration: Skin is not jaundiced.   Neurological:      General: No focal deficit present.      Mental Status: She is alert and oriented to person, place, and time.      Comments: No asterixis.   Psychiatric:         Thought Content: Thought content normal.         LABS: I personally reviewed pertinent laboratory findings.    Lab Results   Component Value Date    ALT 43 10/25/2024    AST 54 (H) 10/25/2024     (H) 08/12/2024    ALKPHOS 267 (H) 10/25/2024    BILITOT 2.3 (H) 10/25/2024       Lab Results   Component Value Date    WBC 6.66 10/25/2024    HGB 12.6 10/25/2024    HCT 38.4 10/25/2024     (H) 10/25/2024     10/25/2024       Lab Results   Component Value Date     10/25/2024    K 3.3 (L) 10/25/2024     10/25/2024    CO2 26 10/25/2024    BUN 29 (H) 10/25/2024    CREATININE " "0.8 10/25/2024    CALCIUM 9.2 10/25/2024    ANIONGAP 9 10/25/2024       Lab Results   Component Value Date    INR 1.0 10/25/2024    INR 1.0 08/12/2024       No results found for: "SMOOTHMUSCAB", "MITOAB"    No results found for: "IRON", "TIBC", "FERRITIN", "SATURATEDIRO"    Lab Results   Component Value Date    HEPBCAB Non-reactive 08/12/2024    HEPCAB Non-reactive 08/12/2024         No results found for: "TREVOR"    Imaging:   I personally reviewed recent imaging studies available on the chart and from outside medical records.      Assessment:  64 y.o. female with A1AT deficiency related cirrhosis. She is decompensated with ascites, history of variceal bleeding.    1. Decompensated liver disease    2. Alpha-1-antichymotrypsin deficiency    3. Portal hypertension    4. Other ascites    5. Secondary esophageal varices with bleeding    6. Hyperbilirubinemia    7. Sarcopenia    8. Umbilical hernia without obstruction and without gangrene    9. Emphysema due to alpha-1-antitrypsin deficiency    10. Family history of alpha 1 antitrypsin deficiency    11. Organ transplant candidate        MELD 3.0: 12 at 10/25/2024 11:25 AM  MELD-Na: 10 at 10/25/2024 11:25 AM  Calculated from:  Serum Creatinine: 0.8 mg/dL (Using min of 1 mg/dL) at 10/25/2024 11:25 AM  Serum Sodium: 138 mmol/L (Using max of 137 mmol/L) at 10/25/2024 11:25 AM  Total Bilirubin: 2.3 mg/dL at 10/25/2024 11:25 AM  Serum Albumin: 3.2 g/dL at 10/25/2024 11:25 AM  INR(ratio): 1 at 10/25/2024 11:25 AM  Age at listing (hypothetical): 64 years  Sex: Female at 10/25/2024 11:25 AM      [facility] Patient Status  Functional Status: 50% - Requires considerable assistance and frequent medical care  Physical Capacity: Limited Mobility    Transplant Candidacy: Patient is a 64 y.o. female with end-stage liver disease secondary to A1AT deficiency related cirrhosis with MELD 3.0 12 here for evaluation for possible OLT. Based on available information, she is a potential liver transplant " candidate. She will undergo liver transplant evaluation after financial approval is obtained. She will be presented to Liver Transplant Selection Committee after all tests and evaluations are complete.    Recommendations:  Ascites/Edema:  Ascites+. Lasix 20mg qd, Aldactone 50mg q. Has required 2 paracenteses in June 2024, Sept 2024 (last 10L removed). Therapeutic paracentesis as needed, limit to 5L at a time. Briefly discussed the possibility of TIPS. Na 2 g diet.    Encephalopathy: Not an active issue.    Variceal screening: Reports EV bleeding in June 2024 treated with esophageal banding. Scheduled for repeat EGD in 10/31/2024.    HCC screening: Last contrasted CT without mention of liver lesions (media tab, 7/10/24). AFP 3.7 (8/12/2024). Repeat abdominal US and AFP every 6 months.     Immunizations: Not immune to HAV or HBV. Recommend vaccination for both    CRC screening: Had cologuard Nov 2021 was negative (report not available). Colonoscopy scheduled for Nov 2025.    Sarcopenia: BMI 19. This is my main concern regarding liver transplant at this time. Counseled regarding high protein diet, protein shakes, bedtime snack.     Emphysema: Followed by local Pulmonologist. Does not require home oxygen at this time. Will need PFTs as part of transplant evaluation.    Umbilical Hernia: Small. Easily reducible. Recommend wearing abdominal binder to help prevent incarceration.     Living Donation: Does not identify potential living donors at this time. Encouraged to explore living donation giving low MELD.    Return to clinic in 2 months.    I have sent communication to the referring physician and/or primary care provider.    Madelin Napier MD  Staff Physician  Hepatology and Liver Transplant  [facility]  [facility] [place]

## 2024-10-25 NOTE — Clinical Note
Proceed with liver transplant evaluation.  Needs PFTs as part of her eval.  RTC in 2 months. Thank you.

## 2024-10-27 LAB
CLINICAL BIOCHEMIST REVIEW: NORMAL
PLPETH BLD-MCNC: <10 NG/ML
POPETH BLD-MCNC: <10 NG/ML

## 2024-11-04 ENCOUNTER — TELEPHONE (OUTPATIENT)
Dept: TRANSPLANT | Facility: CLINIC | Age: 64
End: 2024-11-04
Payer: COMMERCIAL

## 2024-11-04 NOTE — TELEPHONE ENCOUNTER
Patient's phone call returned.  Discussed the following:   Patient scheduled locally tomorrow for a paracentesis.  Last para done in September.  Patient feels as though she may need them more frequently but will continue to schedule thru Dr. Novoa's office.  Patient reports increased thirst.  Informed patient that this may be d/t diuretics.  Denies h/o DM.  Patient agrees to monitor and keep transplant team updated.  Patient reports loose BMs approx 30 minutes after eating.  She states that this just recently began.  She denies any other associated symptoms.  She agrees to contact Dr. Novoa's office if it persists.  Discussed living donation extensively.  All questions answered.  Patient voiced understanding that she will obtain additional information when she comes in for liver transplant evaluation.  Patient reports that she will be applying for Medicare and asking for guidance on rather to keep commercial insurance, supplemental insurance, etc.  Instructed patient to speak w/  for assistance.  She denies that she will be applying within the next few weeks.  Discussed outpatient liver transplant evaluation.  Patient reports that's he will schedule mammogram (due this month), EGD/ colonoscopy, and BMD locally.  She states that pap smear last done 2-3 years ago and not yet due.  Patient agrees to get records.  Spent over 35 minutes on the phone w/ patient.  Patient denied the need for any further assistance at the end of call.  ================================================================      ----- Message from Terri sent at 11/4/2024 11:34 AM CST -----  Regarding: Consult/Advisory  Contact: Ami  Consult/Advisory     Name Of Caller:Ami Darindanisha          Contact Preference:[phone] (home)        Nature of call: Pt is calling to check on her status, states she also have some other questions aw well. Requesting a call back.

## 2024-11-04 NOTE — TELEPHONE ENCOUNTER
Transplant evaluation recommended by Dr. Napier.   Patient w/ h/o A1AT deficiency related cirrhosis.  ICD 10 code is E88/01.  MELD score 12.  Outpatient liver transplant evaluation recommended.  Requesting financial clearance to proceed.

## 2024-11-07 ENCOUNTER — TELEPHONE (OUTPATIENT)
Dept: TRANSPLANT | Facility: CLINIC | Age: 64
End: 2024-11-07
Payer: COMMERCIAL

## 2024-11-07 DIAGNOSIS — K74.60 HEPATIC CIRRHOSIS, UNSPECIFIED HEPATIC CIRRHOSIS TYPE, UNSPECIFIED WHETHER ASCITES PRESENT: ICD-10-CM

## 2024-11-07 DIAGNOSIS — M62.84 SARCOPENIA: ICD-10-CM

## 2024-11-07 DIAGNOSIS — Z76.82 ORGAN TRANSPLANT CANDIDATE: ICD-10-CM

## 2024-11-07 DIAGNOSIS — K74.69 DECOMPENSATED LIVER DISEASE: Primary | ICD-10-CM

## 2024-11-07 NOTE — TELEPHONE ENCOUNTER
Called pt to inform her that financial clearance has been obtained from the insurance company to initiate liver transplant evaluation.  Informed patient that evaluation will take approx 2 days to complete.  Informed her that all testing will be done outpatient.  Patient voiced understanding of the need to have her caregiver present for the  and surgeon consult, as well as for the patient education and reports that her  will accompany her.  All questions/ concerns regarding liver transplant evaluation answered/ addressed.    Orders for liver transplant evaluation entered and submitted to  for scheduling.  Will schedule appts 11/21 and 11/22.

## 2024-11-08 ENCOUNTER — TELEPHONE (OUTPATIENT)
Dept: TRANSPLANT | Facility: CLINIC | Age: 64
End: 2024-11-08
Payer: COMMERCIAL

## 2024-11-08 NOTE — TELEPHONE ENCOUNTER
Patient's phone call returned.  She reports that testing that she agreed to have done locally for liver transplant evaluation has been scheduled. She states that EGD/ colonoscopy, mammogram scheduled.  She states that she is still trying to locate most recent pap smear/ GYN.  She agrees to contact transplant dept so that GYN consult can be scheduled [place] [facility] [place] during Fast Pass eval if needed.    ===================================================================      ----- Message from Prema sent at 11/8/2024 10:29 AM CST -----  Regarding: FW: advise of sched appt  Contact: Patient can be contacted @# [phone]    ----- Message -----  From: Yani Petty  Sent: 11/8/2024  10:27 AM CST  To: [facility] Pre-Liver Transplant Non-Clinical  Subject: advise of sched appt                             PT called to notify Nurse Lynn that she has set up Endoscopy 11/14 and colonoscopy sched 11/26. She has a couple questions please call at your earliest convenience    Patient can be contacted @# [phone]

## 2024-11-12 DIAGNOSIS — Z76.82 ORGAN TRANSPLANT CANDIDATE: Primary | ICD-10-CM

## 2024-11-12 DIAGNOSIS — E88.01 EMPHYSEMA DUE TO ALPHA-1-ANTITRYPSIN DEFICIENCY: Primary | ICD-10-CM

## 2024-11-12 DIAGNOSIS — J43.8 EMPHYSEMA DUE TO ALPHA-1-ANTITRYPSIN DEFICIENCY: Primary | ICD-10-CM

## 2024-11-18 ENCOUNTER — TELEPHONE (OUTPATIENT)
Dept: TRANSPLANT | Facility: CLINIC | Age: 64
End: 2024-11-18
Payer: COMMERCIAL

## 2024-11-18 DIAGNOSIS — Z76.82 ORGAN TRANSPLANT CANDIDATE: Primary | ICD-10-CM

## 2024-11-18 NOTE — TELEPHONE ENCOUNTER
Patient's phone call returned in regards to Fast Pass eval.  Briefly discussed appts but reassured patient that she will receive a phone call on Wednesday w/ further info/ instructions and will also receive a list of all scheduled appts/ times/ locations when she arrives for eval scheduled to begin on Thursday. She voiced understanding.    During call, requested update regarding GYN.  Patient reports that she has not been able to locate records or the name of GYN seen in past.  Patient reminded that this is necessary for liver transplant evaluation.  Patient requested a referral to [facility], but when contacted, staff reports that there is not an OB-GYN there.  Patient made aware.  Per pt's request, called her local PCP, Dr. Chirag Ramirez, to assist w/ referral.  Detailed message left w/ his office.      =============================================================    ----- Message from Raquel sent at 11/18/2024  2:08 PM CST -----  Regarding: Consult/Advisory  Contact: pt @ [phone] (home)  Consult/Advisory     Name Of Caller: Ami Austin    Contact Preference: [phone] (home)      Nature of call: message is for Lynn pt would like a call back to see what to expect on visit. Asking for a call back

## 2024-11-20 ENCOUNTER — TELEPHONE (OUTPATIENT)
Dept: TRANSPLANT | Facility: CLINIC | Age: 64
End: 2024-11-20
Payer: COMMERCIAL

## 2024-11-20 NOTE — TELEPHONE ENCOUNTER
Patient called to confirm that they will be attending the scheduled appointments for Liver Transplant Fast Pass Evaluation scheduled to start 11/21/24  at 0730.  Patient confirms that caregivers will be present for the scheduled appointments.  Patient appointments reviewed along with location and special instructions.  Patient questions answered at this time and number provided to call the office if there is any problem.

## 2024-11-21 ENCOUNTER — CLINICAL SUPPORT (OUTPATIENT)
Dept: TRANSPLANT | Facility: CLINIC | Age: 64
End: 2024-11-21
Payer: COMMERCIAL

## 2024-11-21 ENCOUNTER — HOSPITAL ENCOUNTER (OUTPATIENT)
Dept: CARDIOLOGY | Facility: HOSPITAL | Age: 64
Discharge: HOME | End: 2024-11-21
Attending: INTERNAL MEDICINE
Payer: COMMERCIAL

## 2024-11-21 ENCOUNTER — HOSPITAL ENCOUNTER (OUTPATIENT)
Dept: PULMONOLOGY | Facility: CLINIC | Age: 64
Discharge: HOME | End: 2024-11-21
Payer: COMMERCIAL

## 2024-11-21 ENCOUNTER — HOSPITAL ENCOUNTER (OUTPATIENT)
Dept: RADIOLOGY | Facility: HOSPITAL | Age: 64
Discharge: HOME | End: 2024-11-21
Attending: INTERNAL MEDICINE
Payer: COMMERCIAL

## 2024-11-21 VITALS
RESPIRATION RATE: 16 BRPM | BODY MASS INDEX: 17.01 KG/M2 | SYSTOLIC BLOOD PRESSURE: 102 MMHG | DIASTOLIC BLOOD PRESSURE: 64 MMHG | WEIGHT: 96 LBS | HEART RATE: 83 BPM | HEIGHT: 63 IN

## 2024-11-21 VITALS
HEIGHT: 63 IN | SYSTOLIC BLOOD PRESSURE: 93 MMHG | TEMPERATURE: 97 F | BODY MASS INDEX: 17.27 KG/M2 | OXYGEN SATURATION: 97 % | DIASTOLIC BLOOD PRESSURE: 62 MMHG | WEIGHT: 97.44 LBS | RESPIRATION RATE: 14 BRPM | HEART RATE: 76 BPM

## 2024-11-21 DIAGNOSIS — K74.60 HEPATIC CIRRHOSIS, UNSPECIFIED HEPATIC CIRRHOSIS TYPE, UNSPECIFIED WHETHER ASCITES PRESENT: ICD-10-CM

## 2024-11-21 DIAGNOSIS — K74.69 DECOMPENSATED LIVER DISEASE: ICD-10-CM

## 2024-11-21 DIAGNOSIS — M62.84 SARCOPENIA: ICD-10-CM

## 2024-11-21 DIAGNOSIS — Z76.82 ORGAN TRANSPLANT CANDIDATE: ICD-10-CM

## 2024-11-21 DIAGNOSIS — J43.8 EMPHYSEMA DUE TO ALPHA-1-ANTITRYPSIN DEFICIENCY: ICD-10-CM

## 2024-11-21 DIAGNOSIS — E88.01 EMPHYSEMA DUE TO ALPHA-1-ANTITRYPSIN DEFICIENCY: ICD-10-CM

## 2024-11-21 LAB
AMPHET+METHAMPHET UR QL: NEGATIVE
AV AREA BY CONTINUOUS VTI: 1.9 CM2
AV INDEX (PROSTH): 0.68
AV LVOT MEAN GRADIENT: 1 MMHG
AV LVOT PEAK GRADIENT: 1 MMHG
AV MEAN GRADIENT: 1.2 MMHG
AV PEAK GRADIENT: 2 MMHG
AV VALVE AREA BY VELOCITY RATIO: 2.4 CM²
AV VALVE AREA: 1.9 CM2
AV VELOCITY RATIO: 0.86
BARBITURATES UR QL SCN>200 NG/ML: NEGATIVE
BENZODIAZ UR QL SCN>200 NG/ML: NEGATIVE
BILIRUB UR QL STRIP: NEGATIVE
BSA FOR ECHO PROCEDURE: 1.39 M2
BZE UR QL SCN: NEGATIVE
CANNABINOIDS UR QL SCN: NEGATIVE
CLARITY UR REFRACT.AUTO: CLEAR
COLOR UR AUTO: YELLOW
CREAT UR-MCNC: 62 MG/DL (ref 15–325)
CV ECHO LV RWT: 0.39 CM
CV STRESS BASE HR: 83 BPM
DIASTOLIC BLOOD PRESSURE: 50 MMHG
DOP CALC AO PEAK VEL: 0.7 M/S
DOP CALC AO VTI: 14.5 CM
DOP CALC LVOT AREA: 2.8 CM2
DOP CALC LVOT DIAMETER: 1.9 CM
DOP CALC LVOT PEAK VEL: 0.6 M/S
DOP CALC LVOT STROKE VOLUME: 28.1 CM3
DOP CALCLVOT PEAK VEL VTI: 9.9 CM
E WAVE DECELERATION TIME: 306.03 MS
E/A RATIO: 0.53
E/E' RATIO: 3.75 M/S
ECHO EF ESTIMATED: 64 %
ECHO LV POSTERIOR WALL: 0.7 CM (ref 0.6–1.1)
EJECTION FRACTION: 60 %
ETHANOL UR-MCNC: <10 MG/DL
FRACTIONAL SHORTENING: 33.3 % (ref 28–44)
GLUCOSE UR QL STRIP: NEGATIVE
HGB UR QL STRIP: NEGATIVE
INTERVENTRICULAR SEPTUM: 0.6 CM (ref 0.6–1.1)
IVC DIAMETER: 1.01 CM
IVRT: 128.45 MS
KETONES UR QL STRIP: NEGATIVE
LA MAJOR: 3.19 CM
LA MINOR: 3.03 CM
LA WIDTH: 2.41 CM
LEFT ATRIUM SIZE: 2.09 CM
LEFT ATRIUM VOLUME INDEX MOD: 6.6 ML/M2
LEFT ATRIUM VOLUME INDEX: 9.4 ML/M2
LEFT ATRIUM VOLUME MOD: 9.36 ML
LEFT ATRIUM VOLUME: 13.31 CM3
LEFT INTERNAL DIMENSION IN SYSTOLE: 2.4 CM (ref 2.1–4)
LEFT VENTRICLE DIASTOLIC VOLUME INDEX: 37.46 ML/M2
LEFT VENTRICLE DIASTOLIC VOLUME: 53.19 ML
LEFT VENTRICLE MASS INDEX: 42 G/M2
LEFT VENTRICLE SYSTOLIC VOLUME INDEX: 13.6 ML/M2
LEFT VENTRICLE SYSTOLIC VOLUME: 19.3 ML
LEFT VENTRICULAR INTERNAL DIMENSION IN DIASTOLE: 3.6 CM (ref 3.5–6)
LEFT VENTRICULAR MASS: 59.7 G
LEUKOCYTE ESTERASE UR QL STRIP: NEGATIVE
LV LATERAL E/E' RATIO: 3.75
LV SEPTAL E/E' RATIO: 3.75
METHADONE UR QL SCN>300 NG/ML: NEGATIVE
MV A" WAVE DURATION": 125.59 MS
MV PEAK A VEL: 0.57 M/S
MV PEAK E VEL: 0.3 M/S
NITRITE UR QL STRIP: NEGATIVE
OHS CV CPX 1 MINUTE RECOVERY HEART RATE: 144 BPM
OHS CV CPX 85 PERCENT MAX PREDICTED HEART RATE MALE: 133
OHS CV CPX MAX PREDICTED HEART RATE: 156
OHS CV CPX PATIENT IS FEMALE: 1
OHS CV CPX PATIENT IS MALE: 0
OHS CV CPX PEAK DIASTOLIC BLOOD PRESSURE: 54 MMHG
OHS CV CPX PEAK HEAR RATE: 144 BPM
OHS CV CPX PEAK RATE PRESSURE PRODUCT: NORMAL
OHS CV CPX PEAK SYSTOLIC BLOOD PRESSURE: 86 MMHG
OHS CV CPX PERCENT MAX PREDICTED HEART RATE ACHIEVED: 96
OHS CV CPX RATE PRESSURE PRODUCT PRESENTING: 8134
OHS CV INITIAL DOSE: 10 MCG/KG/MIN
OHS CV PEAK DOSE: 30 MCG/KG/MIN
OHS CV RV/LV RATIO: 0.44 CM
OPIATES UR QL SCN: NEGATIVE
PCP UR QL SCN>25 NG/ML: NEGATIVE
PH UR STRIP: 7 [PH] (ref 5–8)
PISA TR MAX VEL: 2.26 M/S
POST STRESS EJECTION FRACTION: 70 %
PROT UR QL STRIP: NEGATIVE
PULM VEIN A" WAVE DURATION": 125.59 MS
PULM VEIN S/D RATIO: 1.81
PULMONIC VEIN PEAK A VELOCITY: 0.4 M/S
PV PEAK D VEL: 0.26 M/S
PV PEAK S VEL: 0.47 M/S
RA MAJOR: 3.11 CM
RA PRESSURE ESTIMATED: 3 MMHG
RA WIDTH: 2.06 CM
RIGHT ATRIAL AREA: 5.1 CM2
RIGHT VENTRICLE DIASTOLIC BASEL DIMENSION: 1.6 CM
RV TB RVSP: 5 MMHG
RV TISSUE DOPPLER FREE WALL SYSTOLIC VELOCITY 1 (APICAL 4 CHAMBER VIEW): 14.83 CM/S
SINUS: 2.83 CM
SP GR UR STRIP: 1.01 (ref 1–1.03)
STJ: 2.56 CM
SYSTOLIC BLOOD PRESSURE: 98 MMHG
TDI LATERAL: 0.08 M/S
TDI SEPTAL: 0.08 M/S
TDI: 0.08 M/S
TOXICOLOGY INFORMATION: NORMAL
TR MAX PG: 20 MMHG
TRICUSPID ANNULAR PLANE SYSTOLIC EXCURSION: 1.97 CM
TV PEAK GRADIENT: 21 MMHG
TV REST PULMONARY ARTERY PRESSURE: 23 MMHG
URN SPEC COLLECT METH UR: NORMAL
Z-SCORE OF LEFT VENTRICULAR DIMENSION IN END DIASTOLE: -1.92
Z-SCORE OF LEFT VENTRICULAR DIMENSION IN END SYSTOLE: -0.95

## 2024-11-21 PROCEDURE — 99999 PR PBB SHADOW E&M-EST. PATIENT-LVL III: CPT | Mod: PBBFAC,TXP,,

## 2024-11-21 PROCEDURE — 80307 DRUG TEST PRSMV CHEM ANLYZR: CPT | Mod: TXP | Performed by: INTERNAL MEDICINE

## 2024-11-21 PROCEDURE — 71046 X-RAY EXAM CHEST 2 VIEWS: CPT | Mod: TC,TXP

## 2024-11-21 PROCEDURE — 81003 URINALYSIS AUTO W/O SCOPE: CPT | Mod: TXP | Performed by: INTERNAL MEDICINE

## 2024-11-21 PROCEDURE — 93320 DOPPLER ECHO COMPLETE: CPT | Mod: TXP

## 2024-11-21 PROCEDURE — 63600175 PHARM REV CODE 636 W HCPCS: Mod: TXP | Performed by: INTERNAL MEDICINE

## 2024-11-21 PROCEDURE — 25500020 PHARM REV CODE 255: Mod: TXP | Performed by: INTERNAL MEDICINE

## 2024-11-21 PROCEDURE — 63600150 PHARM REV CODE 636: Mod: TXP | Performed by: INTERNAL MEDICINE

## 2024-11-21 RX ORDER — ATROPINE SULFATE 0.1 MG/ML
1 INJECTION INTRAVENOUS
Status: COMPLETED | OUTPATIENT
Start: 2024-11-21 | End: 2024-11-21

## 2024-11-21 RX ORDER — DOBUTAMINE HYDROCHLORIDE 100 MG/100ML
10 INJECTION INTRAVENOUS
Status: COMPLETED | OUTPATIENT
Start: 2024-11-21 | End: 2024-11-21

## 2024-11-21 RX ADMIN — HUMAN ALBUMIN MICROSPHERES AND PERFLUTREN 0.66 MG: 10; .22 INJECTION, SOLUTION INTRAVENOUS at 09:11

## 2024-11-21 RX ADMIN — DOBUTAMINE HYDROCHLORIDE 10 MCG/KG/MIN: 100 INJECTION INTRAVENOUS at 08:11

## 2024-11-21 RX ADMIN — ATROPINE SULFATE 1 MG: 0.1 INJECTION INTRAVENOUS at 09:11

## 2024-11-21 NOTE — PROGRESS NOTES
Ami was seen in clinic for Fast Pass evaluation.  Handbook on pre-liver transplant information (see outline below) was given to the patient.  Patient's caregiver , accompanied her to scheduled appointments.   Patient viewed pre-liver transplant education slides via desktop in transplant clinic.  Informed consent signed and written information given on selection criteria.    LIVER TRANSPLANT WORK-UP EDUCATION   I. UNDERSTANDING THE TRANSPLANT PROCESS     A. Transplant team      B. MELD score      C. Balancing urgency and outcome     D. Liver Transplant Options         1.  Donor         2. Living Donor--rationale, benefits     E. Transplant Work-up         1. Medical         2. Psychological and Social--lifetime commitment, life changes, personal plan/ goal         3. Financial--fundraising     F.  Completed work-up and Next Steps    G. Wait Time         1.  Can be listed at more than one center         2.  Can transfer wait time     H. The Call       I. Possible donor options         1. DCD         2. Hep B Core and Hep C Positive         3. Increased Risk     J.  Liver Transplant Surgery         1. Length         2. Transfusions, cell saver         3. Surgical risks         4. What to expect after sugery--Central lines, drains, Hebert catheter, incision, endotracheal              tube, NG tube, length of stay in ICU/ TSU  II.  HOW TO BEST CARE FOR YOURSELF (Take Five To Thrive)  III. UNDERSTANDING LIFE AFTER TRANSPLANT  A. Medicines after transplant      1. Immunosuppression--infection and rejection  B. Labs   IV. ADULT LIVER SURVIVAL RATES

## 2024-11-22 ENCOUNTER — SOCIAL WORK (OUTPATIENT)
Dept: TRANSPLANT | Facility: CLINIC | Age: 64
End: 2024-11-22
Payer: COMMERCIAL

## 2024-11-22 ENCOUNTER — CLINICAL SUPPORT (OUTPATIENT)
Dept: INFECTIOUS DISEASES | Facility: CLINIC | Age: 64
End: 2024-11-22
Payer: COMMERCIAL

## 2024-11-22 ENCOUNTER — EVALUATION (OUTPATIENT)
Dept: TRANSPLANT | Facility: CLINIC | Age: 64
End: 2024-11-22
Payer: COMMERCIAL

## 2024-11-22 ENCOUNTER — HOSPITAL ENCOUNTER (OUTPATIENT)
Dept: RADIOLOGY | Facility: HOSPITAL | Age: 64
Discharge: HOME | End: 2024-11-22
Attending: INTERNAL MEDICINE
Payer: COMMERCIAL

## 2024-11-22 ENCOUNTER — CLINICAL SUPPORT (OUTPATIENT)
Dept: TRANSPLANT | Facility: CLINIC | Age: 64
End: 2024-11-22
Payer: COMMERCIAL

## 2024-11-22 VITALS
HEART RATE: 76 BPM | HEIGHT: 63 IN | HEIGHT: 63 IN | TEMPERATURE: 97 F | WEIGHT: 97.44 LBS | SYSTOLIC BLOOD PRESSURE: 93 MMHG | WEIGHT: 97.44 LBS | OXYGEN SATURATION: 97 % | RESPIRATION RATE: 14 BRPM | BODY MASS INDEX: 17.27 KG/M2 | RESPIRATION RATE: 14 BRPM | HEART RATE: 76 BPM | DIASTOLIC BLOOD PRESSURE: 62 MMHG | SYSTOLIC BLOOD PRESSURE: 93 MMHG | BODY MASS INDEX: 17.27 KG/M2 | OXYGEN SATURATION: 97 % | TEMPERATURE: 97 F | DIASTOLIC BLOOD PRESSURE: 62 MMHG

## 2024-11-22 DIAGNOSIS — M62.84 SARCOPENIA: ICD-10-CM

## 2024-11-22 DIAGNOSIS — Z76.82 ORGAN TRANSPLANT CANDIDATE: ICD-10-CM

## 2024-11-22 DIAGNOSIS — K74.69 DECOMPENSATED LIVER DISEASE: ICD-10-CM

## 2024-11-22 DIAGNOSIS — Z01.818 PRE-TRANSPLANT EVALUATION FOR LIVER TRANSPLANT: Primary | ICD-10-CM

## 2024-11-22 DIAGNOSIS — K74.60 HEPATIC CIRRHOSIS, UNSPECIFIED HEPATIC CIRRHOSIS TYPE, UNSPECIFIED WHETHER ASCITES PRESENT: ICD-10-CM

## 2024-11-22 DIAGNOSIS — Z76.82 ORGAN TRANSPLANT CANDIDATE: Primary | ICD-10-CM

## 2024-11-22 LAB
DLCO ADJ PRE: 7.76 ML/(MIN*MMHG) (ref 15.81–27.27)
DLCO SINGLE BREATH LLN: 15.81
DLCO SINGLE BREATH PRE REF: 34.9 %
DLCO SINGLE BREATH REF: 21.54
DLCOC SBVA LLN: 2.99
DLCOC SBVA PRE REF: 39.2 %
DLCOC SBVA REF: 4.51
DLCOC SINGLE BREATH LLN: 15.81
DLCOC SINGLE BREATH PRE REF: 36 %
DLCOC SINGLE BREATH REF: 21.54
DLCOCSBVAULN: 6.04
DLCOCSINGLEBREATHULN: 27.27
DLCOCSINGLEBREATHZSCORE: -3.95
DLCOSINGLEBREATHULN: 27.27
DLCOSINGLEBREATHZSCORE: -4.02
DLCOVA LLN: 2.99
DLCOVA PRE REF: 38 %
DLCOVA PRE: 1.71 ML/(MIN*MMHG*L) (ref 2.99–6.04)
DLCOVA REF: 4.51
DLCOVAULN: 6.04
DLVAADJ PRE: 1.77 ML/(MIN*MMHG*L) (ref 2.99–6.04)
ERVN2 LLN: -16449.27
ERVN2 PRE REF: 132.2 %
ERVN2 PRE: 0.96 L (ref -16449.27–16450.73)
ERVN2 REF: 0.73
ERVN2ULN: ABNORMAL
FEF 25 75 LLN: 1.35
FEF 25 75 PRE REF: 39.7 %
FEF 25 75 REF: 2.74
FEV05 LLN: 0.88
FEV05 REF: 1.74
FEV1 FVC LLN: 66
FEV1 FVC PRE REF: 86.1 %
FEV1 FVC REF: 79
FEV1 LLN: 1.71
FEV1 PRE REF: 87.3 %
FEV1 REF: 2.3
FEV1FVCZSCORE: -1.46
FEV1ZSCORE: -0.83
FRCN2 LLN: 1.83
FRCN2 PRE REF: 94.5 %
FRCN2 REF: 2.65
FRCN2ULN: 3.47
FVC LLN: 2.19
FVC PRE REF: 100.8 %
FVC REF: 2.93
FVCZSCORE: 0.05
ICN2REF: 1.91
IVC PRE: 2.98 L (ref 2.19–3.71)
IVC SINGLE BREATH LLN: 2.19
IVC SINGLE BREATH PRE REF: 101.5 %
IVC SINGLE BREATH REF: 2.93
IVCSINGLEBREATHULN: 3.71
LLN IC N2: -16448.09
PEF LLN: 4.25
PEF PRE REF: 85.5 %
PEF REF: 5.91
PHYSICIAN COMMENT: ABNORMAL
PRE DLCO: 7.51 ML/(MIN*MMHG) (ref 15.81–27.27)
PRE FEF 25 75: 1.09 L/S (ref 1.35–4.14)
PRE FET 100: 6.84 SEC
PRE FEV05 REF: 91.9 %
PRE FEV1 FVC: 67.99 % (ref 66.46–89.71)
PRE FEV1: 2.01 L (ref 1.71–2.87)
PRE FEV5: 1.6 L (ref 0.88–2.59)
PRE FRC N2: 2.5 L (ref 1.83–3.47)
PRE FVC: 2.96 L (ref 2.19–3.71)
PRE IC N2: 2.01 L (ref -16448.09–16451.91)
PRE PEF: 5.05 L/S (ref 4.25–7.56)
PRE REF IC N2: 105.2 %
RVN2 LLN: 1.34
RVN2 PRE REF: 80.2 %
RVN2 PRE: 1.54 L (ref 1.34–2.5)
RVN2 REF: 1.92
RVN2TLCN2 LLN: 31.13
RVN2TLCN2 PRE REF: 83.8 %
RVN2TLCN2 PRE: 34.11 % (ref 31.13–50.31)
RVN2TLCN2 REF: 40.72
RVN2TLCN2ULN: 50.31
RVN2ULN: 2.5
TLCN2 LLN: 3.78
TLCN2 PRE REF: 94.7 %
TLCN2 PRE: 4.52 L (ref 3.78–5.76)
TLCN2 REF: 4.77
TLCN2ULN: 5.76
TLCN2ZSCORE: -0.43
ULN IC N2: ABNORMAL
VA PRE: 4.38 L (ref 4.62–4.62)
VA SINGLE BREATH LLN: 4.62
VA SINGLE BREATH PRE REF: 94.8 %
VA SINGLE BREATH REF: 4.62
VASINGLEBREATHULN: 4.62
VCMAXN2 LLN: 2.19
VCMAXN2 PRE REF: 101.5 %
VCMAXN2 PRE: 2.98 L (ref 2.19–3.71)
VCMAXN2 REF: 2.93
VCMAXN2ULN: 3.71

## 2024-11-22 PROCEDURE — 93975 VASCULAR STUDY: CPT | Mod: TC,TXP

## 2024-11-22 PROCEDURE — 99999 PR PBB SHADOW E&M-EST. PATIENT-LVL III: CPT | Mod: PBBFAC,,,

## 2024-11-22 PROCEDURE — 99999 PR PBB SHADOW E&M-EST. PATIENT-LVL I: CPT | Mod: PBBFAC,TXP,,

## 2024-11-22 PROCEDURE — 74170 CT ABD WO CNTRST FLWD CNTRST: CPT | Mod: 26,TXP,, | Performed by: RADIOLOGY

## 2024-11-22 PROCEDURE — 25500020 PHARM REV CODE 255: Mod: TXP | Performed by: INTERNAL MEDICINE

## 2024-11-22 PROCEDURE — 74170 CT ABD WO CNTRST FLWD CNTRST: CPT | Mod: TC,TXP

## 2024-11-22 PROCEDURE — 99999 PR PBB SHADOW E&M-EST. PATIENT-LVL V: CPT | Mod: PBBFAC,TXP,,

## 2024-11-22 PROCEDURE — 99204 OFFICE O/P NEW MOD 45 MIN: CPT | Mod: S$GLB,TXP,, | Performed by: PHYSICIAN ASSISTANT

## 2024-11-22 RX ADMIN — IOHEXOL 75 ML: 350 INJECTION, SOLUTION INTRAVENOUS at 08:11

## 2024-11-22 NOTE — PROGRESS NOTES
Pt received Prevnar 20 IM to left deltoid & Hep B vaccine IM to right arm. Pt tolerated well, left clinic in NAD.

## 2024-11-22 NOTE — PROGRESS NOTES
PHARM.D. PRE-TRANSPLANT NOTE:    This patient's medication therapy was evaluated as part of her pre-transplant evaluation.      The following general pharmacologic concerns were noted: none    The following concerns for post-operative pain management were noted: none    The following pharmacologic concerns related to HCV therapy were noted: none      This patient's medication profile was reviewed for considerations for DAA Hepatitis C therapy:    [x]  No current inducers of CYP 3A4 or PGP  [x]  No amiodarone on this patient's EMR profile in the last 24 months  [x]  No past or current atrial fibrillation on this patient's EMR profile       Current Outpatient Medications   Medication Sig Dispense Refill    albuterol (PROVENTIL/VENTOLIN HFA) 90 mcg/actuation inhaler 2 puffs 4 (four) times daily as needed.      famotidine (PEPCID) 40 MG tablet Take 40 mg by mouth once daily.      furosemide (LASIX) 20 MG tablet Take 20 mg by mouth 2 (two) times daily.      pantoprazole (PROTONIX) 40 MG tablet Take 40 mg by mouth every evening.      spironolactone (ALDACTONE) 50 MG tablet Take 50 mg by mouth 2 (two) times daily.      SYMBICORT 160-4.5 mcg/actuation HFAA Inhale 2 puffs into the lungs every 12 (twelve) hours.       No current facility-administered medications for this visit.           I am available for consultation and can be contacted, as needed by the other members of the Transplant team.

## 2024-11-22 NOTE — PROGRESS NOTES
Transplant Surgery  Liver Transplant Recipient Evaluation    Referring Provider: Sj Novoa    Subjective:     Reason for Visit: evaluation for liver transplant    History of Present Illness: Ami Austin is a 64 y.o. female who is being evaluated for liver transplant due to Cirrhosis: Other, Specify - A1AT deficiency. Ami reports ascites (recurrent paracentesis needed), edema, esophageal or gastric varices, fatigue, muscle wasting, and portal hypertension.      Overall doing ok but increasingly fatigued.  Recurrent daniela with 5L taken off each time.      External provider notes reviewed: Yes    Review of Systems   Constitutional:  Positive for activity change, fatigue and unexpected weight change. Negative for fever.   Respiratory:  Negative for cough, shortness of breath and wheezing.    Cardiovascular:  Negative for chest pain.   Gastrointestinal:  Positive for abdominal distention. Negative for constipation, nausea, rectal pain and vomiting.     Objective:     Physical Exam  Constitutional:       Appearance: Normal appearance.   HENT:      Head: Normocephalic and atraumatic.   Eyes:      Extraocular Movements: Extraocular movements intact.      Pupils: Pupils are equal, round, and reactive to light.   Cardiovascular:      Rate and Rhythm: Normal rate.   Pulmonary:      Effort: Pulmonary effort is normal.   Abdominal:      General: Abdomen is flat. There is no distension.      Palpations: Abdomen is soft. There is no mass.      Tenderness: There is no abdominal tenderness.      Hernia: No hernia is present.      Comments: Prior lower midline scar, well healed    1 cm umbilical hernia with asictes, no bowel   Skin:     General: Skin is warm and dry.      Coloration: Skin is jaundiced.   Neurological:      General: No focal deficit present.      Mental Status: She is alert and oriented to person, place, and time.   Psychiatric:         Mood and Affect: Mood normal.         Behavior: Behavior normal.          MELD 3.0: 16 at 11/21/2024  7:20 AM  MELD-Na: 11 at 11/21/2024  7:20 AM  Calculated from:  Serum Creatinine: 0.7 mg/dL (Using min of 1 mg/dL) at 11/21/2024  7:20 AM  Serum Sodium: 131 mmol/L at 11/21/2024  7:20 AM  Total Bilirubin: 3 mg/dL at 11/21/2024  7:20 AM  Serum Albumin: 3.2 g/dL at 11/21/2024  7:20 AM  INR(ratio): 1 at 11/21/2024  7:20 AM  Age at listing (hypothetical): 64 years  Sex: Female at 11/21/2024  7:20 AM      Diagnostics:  The following labs have been reviewed: CBC  BMP  CMP  PT  INR  RPR  The following radiology images have been independently reviewed and interpreted: CT Abd/Pelvis    Diagnoses:  1. Pre-transplant evaluation for liver transplant    2. Decompensated liver disease    3. Sarcopenia    4. Hepatic cirrhosis, unspecified hepatic cirrhosis type, unspecified whether ascites present    5. Organ transplant candidate        Transplant Surgery - Candidacy   Assessment/Plan:     Transplant Candidacy: Ami Austin is a 64 y.o. 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  Based on available information, Ami is a suitable liver transplant candidate.  She will be presented to selection committee after all tests and evaluations are complete.      Prior surgeries:  lap meckel's resection; open ovarian cyst removal.  Sarcopenia - lost 40# in the last year unintentionally.  Fatigued.    Interested in living donor - will look for a donor.      Additional testing to be completed according to Liver : Written Order Guideline for Adult Liver Transplant Evaluation (LI-02)    Interpretation of tests and discussion of patient management involves all members of the multidisciplinary transplant team.    Alvarado Rosa MD, PhD      UNOS Patient Status  Functional Status: 60% - Requires occasional assistance but is able to care for needs  Physical Capacity: No Limitations    Counseling: I discussed with Ami the benefits of liver  transplantation.  We discussed the evaluation and listing procedures.  We discussed the MELD system and the associated waiting times.  We discussed national and center specific survival results.  We discussed the option of being multiply listed in different OPOs.  We discussed the option of living donation versus  donor transplantation and the advantages and relative disadvantages of each.   We discussed the risks, benefits and potential complications related to surgery including the risks related to anesthesia, bleeding, infection, primary non-function of the allograft, the risk of reoperation as well as the risk of death.  We discussed the typical post-operative course, length of hospitalization, the long-term use of immunosuppressive therapy as well as the need for long-term routine follow-up.    Patient advised that it is recommended that all transplant candidates, and their close contacts and household members receive Covid vaccination.    Coronavirus disease (COVID-19) caused by severe acute respiratory virus coronavirus 2 (SARS-C0V 2) is associated with increased mortality in solid organ transplant recipients (SOT) compared to non-transplant patients. Vaccine responses to vaccination are depressed against SARS-CoV2 compared to normal individuals but improve with third vaccination doses. Vaccination prior to SOT provides both the best opportunity for transplant candidates to develop protective immunity and to reduce the risk of serious COVID19 infections post transplantation. Organ transplant candidates at [facility] will be required to receive SARS-CoV-2 vaccination prior to being listed with a an active status, whenever possible. Exceptions will be made for disability related reasons or for sincerely held Zoroastrianism beliefs.     PHS: I discussed the use of organs from donors with PHS risk criteria, including the testing protocols utilized, as well as data from the  literature regarding the likelihood of transmission of hepatitis or HIV.  The patient that she is willing to consider such grafts.  DCD: I discussed the use of organs recovered by donation after cardiac death (DCD), including slightly decreased graft survival and greater risk of arterial and biliary complications. The potential advantage to the recipient is possibly receiving a transplant sooner by accepting such an organ. The patient that she is willing to consider such grafts.  HBcAb: I discussed the use of organs from donors with HBcAb in conjunction with long term use of HBV antiviral drugs, such as lamivudine. The small but measurable risk of hepatitis B seroconversion was discussed as well as the potentially life long need to continue antiviral drugs. The patient that she is willing to consider such grafts.  HCV Non-viremic recipient: I discussed the use of HCV-positive organs in naive recipients, including the risk of viral transmission to the patients or others, potential insurance barriers for antiviral medication coverage, risk for fibrosing cholestatic hepatitis, death or graft loss. The potential advantage to the recipient is the possibility of receiving a transplant sooner with decreased mortality risk by accepting such an organ. The patient that she is willing to consider such grafts.  LDLT: I discussed the nature of living donor liver transplant, including donor risks and more frequent recipient complications. The patient that she is willing to consider such grafts.  Covid: I discussed that this donor has tested positive for the covid virus, but with very low levels of virus and no evidence of covid disease. Although the risk of transmission is unknown, we believe this donor is not infectious and use of the abdominal organs is safe.  To date, these organs have been used with no evidence of transmission. The patient that she is willing to consider such grafts.

## 2024-11-22 NOTE — PROGRESS NOTES
"TRANSPLANT NUTRITIONAL ASSESSMENT    Referring Provider: Madelin Napier MD    Reason for Visit: Pre-liver transplant work-up    Age: 64 y.o.  Sex: female    Patient Active Problem List   Diagnosis    Organ transplant candidate    Family history of alpha 1 antitrypsin deficiency    Emphysema due to alpha-1-antitrypsin deficiency    Sarcopenia    Hyperbilirubinemia    Secondary esophageal varices with bleeding    Other ascites    Portal hypertension    Alpha-1-antichymotrypsin deficiency    Decompensated liver disease    Umbilical hernia without obstruction and without gangrene     Past Medical History:   Diagnosis Date    Alpha-1-antitrypsin deficiency     Ascites     Emphysema, unspecified     Esophageal varices     Portal hypertension     Splenomegaly     Unspecified cirrhosis of liver      Lab Results   Component Value Date    GLU 93 11/21/2024    K 3.1 (L) 11/21/2024    ALBUMIN 3.2 (L) 11/21/2024    CALCIUM 9.3 11/21/2024     Other Pertinent Labs:     Current Outpatient Medications   Medication Sig    albuterol (PROVENTIL/VENTOLIN HFA) 90 mcg/actuation inhaler 2 puffs 4 (four) times daily as needed.    famotidine (PEPCID) 40 MG tablet Take 40 mg by mouth once daily.    furosemide (LASIX) 20 MG tablet Take 20 mg by mouth 2 (two) times daily.    pantoprazole (PROTONIX) 40 MG tablet Take 40 mg by mouth every evening.    spironolactone (ALDACTONE) 50 MG tablet Take 50 mg by mouth 2 (two) times daily.    SYMBICORT 160-4.5 mcg/actuation HFAA Inhale 2 puffs into the lungs every 12 (twelve) hours.     No current facility-administered medications for this visit.     Allergies: Ephedrine, House dust mite, and Egg derived    Ht Readings from Last 1 Encounters:   11/22/24 5' 2.52" (1.588 m)     Wt Readings from Last 1 Encounters:   11/22/24 44.2 kg (97 lb 7.1 oz)      BMI: Body mass index is 17.53 kg/m².    Usual Weight: 127-128 lb  Weight Change/Time: about a year ago, weight loss began, most significantly over the last 6 " months  Current Diet: gluten free, low sodium low dairy, egg free (allergic)  Appetite/Current Intake: fair   Exercise/Physical Activity: mobile, functional, muscle wasting noted, could not complete LFI (chair stands or standing unassisted portions)  LFI: *could not complete  Nutritional/Herbal Supplements: none  Potential Food/Medication Interactions: reviewed  Chewing/Swallowing Problems: none  Symptoms: vomiting and diarrhea  Assessment of Lab Values: K 3.1, Alb 3.2  Support System: spouse and additional caregiver present, both voice support in pt's nutrition /diet    Estimated Kcal Need: 1540 kcal (35 kcal/lkg)  Estimated Protein Need: 44-66 gm (1-1.5 gm/kg)    Nutritional History:   Pt has a long history of GI issues, intolerances to certain foods and allergic to some items. She has issues with diarrhea after eating some foods, will eliminate them and sometimes issue resolves. Diet recall included the followign:  -toast with unsalted butter or no butter  -meat loaf  -hamburger  -chicken on toast  -mashed potato (red or white)  -salmon  -rice noodles w/ chicken, broth, goat cheese  -angelica  -cooked green beans  -raw cucumbers, peeled  -cooked carrots  -blueberry pie  -unsalted potato chips  -unsalted PB  -pumpkin bread  -coconut milk  -bone broth  -water, hot tea w/ honey  -popsicles, sorbet, store bought smoothies (fruit & veg only)      Nutritional Diagnoses  Problem: unintend weight loss  Etiology: r/t liver disease progression, decreased energy intake  Symptoms: aeb BMI<19, reported 30 lb weight loss from usual weight to current    Educational Need? yes  Barriers: none identified  Discussed with: patient and caregiver  Interventions: Patient taught nutrition information regarding Pre-liver transplant work-up  .  Pre liver transplant nutrition packet provided and discussed. Pt advised on the recommendations to follow a low sodium diet while taking in adequate protein.  This packet includes label low sodium  reading tips, seasoning suggestions, protein intake goal amount (gm) for the individual patient, as well as oral supplement suggestions.   Exercise and physical activity are encouraged.  Goals/Recommendations: diet adherence, small frequent meals and snacks, and consumption of teresa nutritional supplements  Actions Taken: instruct/provide written information  Strategies Used: problem solving, goal setting, motivational interviewing  Patient and/or family comprehend instructions: yes , adherence expected  Outcome: Verbalizes understanding  Monitoring: Contact information provided, will f/u in clinic and communicate with the care team as needed.     Counseling Time: 15 minutes

## 2024-11-22 NOTE — PROGRESS NOTES
PRE-TRANSPLANT INFECTIOUS DISEASE CONSULT    Reason for Visit:  Pre-transplant evaluation  Referring Provider: Dr. Madelin Napier     History of Present Illness:    64 y.o. female with a history of liver disease presents for pre-liver transplant evaluation.  She has a history of paracentesis x 4 but denies ever infection.    Infectious History:  Recent hospital admissions: Yes - June secondary to liver complications - no infectious issues  Recent infections: No  Recent or current antibiotic use: No  History of recurrent infections *(sinus / pneumonia / UTI / SBP)*: No  History of foot wound or bone/joint infection: No  Recent dental infections, issues or procedures: No  History of chicken pox: Yes  History of shingles: No  History of STI: No  History of COVID infection: Yes    History of Immunosuppression:  Prior chemotherapy / immunosuppression: No  Prior transplant: No  History of splenectomy: No    Tuberculosis:  Prior screening for latent TB: Yes - 2018 reportedly negative  Prior diagnosis of latent TB: No  Risk factors for TB *known exposure, incarceration, homelessness*: No    Geographical exposures:  Currently lives in [place], [place] with  and dog  Lived in the following states: [place], [place], [place]  Lived or travelled to the [facility] [place]: Yes - [place] 3 months in 2008  International travel: Yes - [place] and [place]  Travel-associated illness: No    Social/Environmental:  Occupation:  Semi-retired teacher  Pets: Yes - dog  Livestock: No  Fishing / hunting: No  Hobbies: Reading/tutoring/writing  Water: [place]  Consumption of raw/undercooked meat or seafood?  No  Tobacco: No  Alcohol: No  Recreational drug use:  No  Sexual partners:       Past Histories:   Past Medical History:   Diagnosis Date    Alpha-1-antitrypsin deficiency     Ascites     Emphysema, unspecified     Esophageal varices     Portal hypertension     Splenomegaly     Unspecified cirrhosis of liver      Past Surgical History:   Procedure  Laterality Date    ESOPHAGOGASTRODUODENOSCOPY  06/18/2024    Grade III esophageal varices.    MECKEL DIVERTICULUM EXCISION      OVARIAN CYST REMOVAL       No family history on file.  Social History     Tobacco Use    Smoking status: Never   Substance Use Topics    Alcohol use: Not Currently     Review of patient's allergies indicates:   Allergen Reactions    Ephedrine Other (See Comments)     Hyperexcitability    House dust mite     Egg derived Hives and Nausea And Vomiting         Current antibiotics:  Antibiotics (From admission, onward)      None              Review of Systems  Review of Systems   Constitutional: Negative for chills, fever, malaise/fatigue and night sweats.   Cardiovascular:  Negative for chest pain.   Respiratory:  Negative for cough, hemoptysis, shortness of breath, sputum production and wheezing.    Skin:  Negative for rash and suspicious lesions.   Gastrointestinal:  Negative for abdominal pain, constipation, diarrhea, heartburn, nausea and vomiting.   Genitourinary:  Negative for dysuria and hematuria.          Objective  Physical Exam  Constitutional:       General: She is not in acute distress.     Appearance: Normal appearance. She is well-developed. She is not ill-appearing, toxic-appearing or diaphoretic.   HENT:      Head: Normocephalic and atraumatic.      Mouth/Throat:      Lips: Pink. No lesions.      Mouth: Mucous membranes are moist. No injury or oral lesions.      Dentition: Does not have dentures. Dental caries (filled and crowned) present. No gingival swelling, dental abscesses or gum lesions.      Tongue: No lesions.      Palate: No lesions.      Pharynx: Oropharynx is clear.   Cardiovascular:      Rate and Rhythm: Normal rate and regular rhythm.      Heart sounds: Normal heart sounds. No murmur heard.     No friction rub. No gallop.   Pulmonary:      Effort: Pulmonary effort is normal. No respiratory distress.      Breath sounds: Normal breath sounds. No wheezing or rales.  "  Abdominal:      General: Bowel sounds are normal. There is distension.      Palpations: Abdomen is soft. There is no mass.      Tenderness: There is no abdominal tenderness. There is no guarding or rebound.   Skin:     General: Skin is warm.   Neurological:      Mental Status: She is alert and oriented to person, place, and time.   Psychiatric:         Behavior: Behavior normal.           Labs:    CBC:   Lab Results   Component Value Date    WBC 6.84 11/21/2024    HGB 12.4 11/21/2024    HCT 35.8 (L) 11/21/2024    MCV 99 (H) 11/21/2024     11/21/2024    GRAN 5.0 11/21/2024    GRAN 72.4 11/21/2024    LYMPH 1.1 11/21/2024    LYMPH 16.2 (L) 11/21/2024    MONO 0.7 11/21/2024    MONO 9.8 11/21/2024    EOSINOPHIL 1.2 11/21/2024       Syphilis screening:   Lab Results   Component Value Date    TREPABIGMIGG Nonreactive 11/21/2024        TB screening: No results found for: "TBGOLDPLUS", "TSPOTSCREN"    HIV screening:   Lab Results   Component Value Date    RBE30WGRI Non-reactive 11/21/2024       Strongyloides IgG: No results found for: "STRONGANTIGG"    Hepatitis Serologies:   Lab Results   Component Value Date    HEPAIGG Reactive 11/21/2024    HEPBCAB Non-reactive 11/21/2024    HEPBSAB <3.00 11/21/2024    HEPBSAB Non-reactive 11/21/2024    HEPCAB Non-reactive 11/21/2024        Varicella IgG: No results found for: "VARICELLAINT"        There is no immunization history on file for this patient.     Imaging: None    Immunization History:  Received all childhood vaccines: Yes  All household members receive annual flu vaccine: No  All household members are up to date on COVID vaccine: No    Assessment and Plan    1. Risks of Infection: Available serologies were reviewed. No unusual risks of infection or significant barriers to transplantation were identified from the infectious disease standpoint given the information available at this time.    - Acute infectious issues: None   - Pending serologies: Strongyloides IgG and " VZV IgG and coccidiodes serum Ab with reflex   - Please call if any pending serologic testing is positive.    2. Immunizations:  Based on the patient's immunization history and serologies, the following immunizations are recommended:  - Hepatitis A    Patient does have immunity to hepatitis A    Vaccination ordered today: Yes   - Hepatitis B    Patient does not have immunity to hepatitis B    Vaccination ordered today: Yes   - COVID    Current CDC vaccination recommendations were discussed with the patient   - Annual high dose influenza     Vaccination ordered today: Yes - has not had in past 2/2 egg allergy - Egg free Flu vaccine ordered   - Prevnar 20    Vaccination ordered today: Yes   - Tdap    Vaccination ordered today: Yes   - Shingrix    Vaccination ordered today: Yes    Recommended Pre-Transplant Immunization Schedule   Vaccine  0m 1m 2m 6m   Pneumococcal conjugate vaccine (Prevnar 20) X      Tetanus-diphtheria-pertussis (Tdap)* X      Hepatitis A Vaccine (Havrix)** X   X   Hepatitis B Vaccine (Heplisav)** X X     Influenza (annual) X      Zoster Recombinant Vaccine (Shingrix) X  X           *Administer booster every 10 years.       **Administer if no immunity demonstrated on serologies               Patient will receive vaccines today in ID clinic, and was provided with a prescription to receive all subsequent vaccine doses at local pharmacy.    3. Counseling:   I discussed with the patient the risk for increased susceptibility to infections following transplantation including increased risk for infection right after transplant and if rejection should occur.  The patient has been counseled on the importance of vaccinations to decrease risk of infection and severe illness. Specific guidance has been provided to the patient regarding the patient's occupation, hobbies and activities to avoid future infectious complications.     4. Transplant Candidacy: Based on available information, there are no identified  significant barriers to transplantation from an infectious disease standpoint pending acceptable ID serologies which are still pending.  Final determination of transplant candidacy will be made once evaluation is complete and reviewed by the Selection Committee.      Follow up with infectious disease as needed.       The total time for evaluation and management services performed on 11/22/2024 was greater than 45 minutes.

## 2024-11-22 NOTE — PROGRESS NOTES
Transplant Recipient Adult Psychosocial Assessment  SW met with pt, pt  and pt sister in law in clinic    Ami Austin  [address] [address]  Telephone Information:   Mobile [phone]   Home  [phone] (home)  Work  There is no work phone number on file.  E-mail  [email or web address]    Sex: female  YOB: 1960  Age: 64 y.o.    Encounter Date: 2024  U[place] Citizen: yes  Primary Language: English   Needed: no    Emergency Contact:  Name: Eric Austin  Relationship:   Address: same as patient  Phone Numbers:  C: [phone]    Family/Social Support:   Number of dependents/: None  Marital history: Pt and  have been  for 34yrs  Other family dynamics: Pt has support from her  and sister in law. Pt has a sister, in [place], who has received two liver transplants, and a brother. Pt has a brother that is  as well as her father. Pt's mother is living, in [place], and is in poor health. Pt states all 3 of her siblings have/had the Alpha-1 deficiency as well. Pt and  also have an adult adopted son, Arthur Austin, they state he is troubled.    Household Composition:  Name: Eric Austin  Age: 63  Relationship:   Does person drive? yes    Do you and your caregivers have access to reliable transportation? yes  PRIMARY CAREGIVER: Eric Austin, pt , will be primary caregiver, phone number [phone].      provided in-depth information to patient and caregiver regarding pre- and post-transplant caregiver role.   strongly encourages patient and caregiver to have concrete plan regarding post-transplant care giving, including back-up caregiver(s) to ensure care giving needs are met as needed.    Patient and Caregiver states understanding all aspects of caregiver role/commitment and is able/willing/committed to being caregiver to the fullest extent necessary.    Patient and Caregiver verbalizes  understanding of the education provided today and caregiver responsibilities.         remains available. Patient and Caregiver agree to contact  in a timely manner if concerns arise.      Able to take time off work without financial concerns: yes.     Additional Significant Others who will Assist with Transplant:  Name: Snow Ngo (confirmed, was present for assessment)  Age: 58  City: [place] State: [place]  Relationship:  sister in law  Does person drive? yes  C: [phone]    Living Will: yes  Healthcare Power of : yes  Advance Directives on file: <<no information> per medical record.  Verbally reviewed LW/HCPA information.  They completed these forms through a . Pt's  is her HC POA.    Living Donors: No.    Highest Education Level: Associate/Bachelor Degree  Reading Ability: college  Reports difficulty with: N/A, wears glasses  Learns Best By:  combination of methods     Status: no  VA Benefits: no     Working for Income: yes  If yes, working activity level: Working Part Time Due to Patient Choice  Patient is  working as a . Pt was previously a .    Spouse/Significant Other Employment: Spouse works for [employer] and does taxes  - April.    Disabled: Pt has applied for disability, and is waiting for a response.    Monthly Income:  Salary/Wages: $4,000  Able to afford all costs now and if transplanted, including medications: yes, pt and  live in his parents home. His father is  and his mother is in a nursing home.  Patient and Caregiver verbalizes understanding of personal responsibilities related to transplant costs and the importance of having a financial plan to ensure that patients transplant costs are fully covered.      provided fundraising information/education.  Patient and Caregiver verbalizes understanding.   remains available.    Insurance:   Payer/Plan Subscr  Sex  Relation Sub. Ins. ID Effective Group Num   1. BLUE CROSS BL* ISHMAEL AMBROCIO* 9/28/1961 Male Spouse [id number] 1/1/24 [id number]                                   [address] [address]     Primary Insurance (for UNOS reporting): Private Insurance  Secondary Insurance (for UNOS reporting): None  Patient and Caregiver verbalizes clear understanding that patient may experience difficulty obtaining and/or be denied insurance coverage post-surgery. This includes and is not limited to disability insurance, life insurance, health insurance, burial insurance, long term care insurance, and other insurances.    Patient and Caregiver also reports understanding that future health concerns related to or unrelated to transplantation may not be covered by patient's insurance.  Resources and information provided and reviewed.      Patient and Caregiver provides verbal permission to release any necessary information to outside resources for patient care and discharge planning.  Resources and information provided are reviewed.      Infusion Service: patient utilizing? no  Home Health: patient utilizing? no  DME:  cane, shower chair  Pulmonary/Cardiac Rehab: None reported   ADLS:  Pt is independent and drives    Adherence:   Pt reports a high level of adherence to her plan of care.  Adherence education and counseling provided.     Per History Section:  Past Medical History:   Diagnosis Date    Alpha-1-antitrypsin deficiency     Ascites     Emphysema, unspecified     Esophageal varices     Portal hypertension     Splenomegaly     Unspecified cirrhosis of liver      Social History     Tobacco Use    Smoking status: Never    Smokeless tobacco: Not on file   Substance Use Topics    Alcohol use: Not Currently     Social History     Substance and Sexual Activity   Drug Use Not on file     Social History     Substance and Sexual Activity   Sexual Activity Not on file       Per Today's Psychosocial:  Tobacco: none, patient denies any use.  Alcohol:  none, patient denies any use.  Illicit Drugs/Non-prescribed Medications: none, patient denies any use.    Patient and Caregiver states clear understanding of the potential impact of substance use as it relates to transplant candidacy and is aware of possible random substance screening.  Substance abstinence/cessation counseling, education and resources provided and reviewed.     Arrests/DWI/Treatment/Rehab: patient denies    Psychiatric History:    Mental Health:  None reported  Psychiatrist/Counselor: None reported  Medications:  None reported  Suicide/Homicide Issues: None reported currently or in the past   Safety at home: Pt feels safe at home    Knowledge: Patient and Caregiver states having clear understanding and realistic expectations regarding the potential risks and potential benefits of organ transplantation and organ donation, agrees to discuss with health care team members and support system members, and to utilize available resources and express questions and/or concerns in order to further facilitate the pt informed decision-making.  Resources and information provided and reviewed.     Patient and Caregiver is aware of [facility]'s affiliation and/or partnership with agencies in home health care, LTAC, SNF, [facility], and other hospitals and clinics.    Understanding: Patient and Caregiver reports having a clear understanding of the many lifetime commitments involved with being a transplant recipient, including costs, compliance, medications, lab work, procedures, appointments, concrete and financial planning, preparedness, timely and appropriate communication of concerns, abstinence (ETOH, tobacco, illicit non-prescribed drugs), adherence to all health care team recommendations, support system and caregiver involvement, appropriate and timely resource utilization and follow-through, mental health counseling as needed/recommended, and patient and caregiver responsibilities.  Social Service Handbook, resources  and detailed educational information provided and reviewed.  Educational information provided.    Patient and Caregiver also reports current and expected compliance with health care regime and states having a clear understanding of the importance of compliance.      Patient and Caregiver reports a clear understanding that risks and benefits may be involved with organ transplantation and with organ donation.      Patient and Caregiver also reports clear understanding that psychosocial risk factors may affect patient, and include but are not limited to feelings of depression, generalized anxiety, anxiety regarding dependence on others, post traumatic stress disorder, feelings of guilt and other emotional and/or mental concerns, and/or exacerbation of existing mental health concerns.  Detailed resources provided and discussed.     Patient and Caregiver agrees to access appropriate resources in a timely manner as needed and/or as recommended, and to communicate concerns appropriately.  Patient and Caregiver also reports a clear understanding of treatment options available.      reviewed education, provided additional information, and answered questions.    Feelings or Concerns: Pt voiced concerns about the cost, and pt  about losing her. Support and resources provided.    Coping: Identify Patient & Caregiver Strategies to [place]:   1. In the past - being very active, tutoring more, traveling to visit her sister in [place], and mother in [place], attending Yazidism more regularly, having more energy.   2. Currently & Pre-transplant - Some tutoring, watches movies, Yazidism as she can   3. At the time of surgery - Family support   4. During post-Transplant & Recovery Period - Family support    Goals: To return to previously enjoyed activities and traveling.      Interview Behavior: Patient and Caregiver presents as alert and oriented x 4, pleasant, communicative, and asking and answering questions appropriately.           Transplant Social Work - Candidacy  Assessment/Plan:     Psychosocial Suitability:  Based on psychosocial risk factors, patient presents as low risk, due to lack of psychosocial risk factors. Pt has no history of substance abuse, or mental health concerns. Pt has a solid caregiver plan. Pt has active insurance and adequate finances .    Recommendations/Additional Comments:   -Fundraising  -Local Lodging  -Notify SW of any changes to the above plan    Final candidacy will be determined by the transplant committee.    Lissy Hyde LCSW

## 2024-11-26 ENCOUNTER — TELEPHONE (OUTPATIENT)
Dept: TRANSPLANT | Facility: CLINIC | Age: 64
End: 2024-11-26
Payer: COMMERCIAL

## 2024-11-26 NOTE — TELEPHONE ENCOUNTER
Called patient to discuss test results from transplant eval and recommendations.  No answer at this time.  Message left on VM requesting a call back to discuss above.  Contact info provided.  Awaiting call back.

## 2024-11-27 NOTE — PROGRESS NOTES
Patient seen in clinic with caregiver.  Consents reviewed and signatures obtained.   Patient given supplies needed to obtain urine specimen.    Patient's name and date of birth verified.   Instructions reviewed with the patient on the way the specimen should be obtained.   Patient stated that  they understood the information provided for the collection and  placement of the specimen. Specimen collected in clinic .Patient given supplies and printed instructions for the collection of the 24 hour urine specimen.  Patient reports understanding the instructions provided to obtain the specimen and the storage of the specimen while at home.  Patient given instructed to bring the container to 2nd floor lab when the collection is completed.  Patient contact information verified.  Patient questions answered and concerns addressed.

## 2024-12-02 ENCOUNTER — TELEPHONE (OUTPATIENT)
Dept: TRANSPLANT | Facility: CLINIC | Age: 64
End: 2024-12-02
Payer: COMMERCIAL

## 2024-12-02 ENCOUNTER — PATIENT MESSAGE (OUTPATIENT)
Dept: TRANSPLANT | Facility: CLINIC | Age: 64
End: 2024-12-02
Payer: COMMERCIAL

## 2024-12-02 NOTE — TELEPHONE ENCOUNTER
Called patient to discuss results from transplant evaluation and recommendations. She voiced understanding of the need for additional testing, including Tspot and CT of chest.  Also informed her of other pending tests required prior to presentation in the liver meeting--GYN f/u (and BMD).  Patient reports that she referral to local GYN pending (from PCP).  Orders for all other tests sent to patient and she agrees to request Dr. Novoa's office to coordinate, and to notify transplant dept for any difficulty scheduling.  Patient reports that mammogram and endoscopy already done at [facility].  Will request records.    During call, also discussing living donation.  Patient aware that living done only covered at [facility].  Patient reports that she has discussed this w/  during transplant evaluation and states that it may be possible to get approval.  Patient voiced understanding that she will first need to be approved for liver transplant listing by the committee prior to pursuing possible living donation.  Also discussed recommended vaccinations.  Sent patient message via [facility] portal w/ recommendations.  She agrees to schedule locally.

## 2024-12-17 ENCOUNTER — TELEPHONE (OUTPATIENT)
Dept: TRANSPLANT | Facility: CLINIC | Age: 64
End: 2024-12-17
Payer: COMMERCIAL

## 2024-12-17 NOTE — TELEPHONE ENCOUNTER
Spoke w/ patient about tests needed to complete the transplant evaluation.  She reports that T spot done as recommended and Chest CT scheduled 12/23.  Still working on scheduling GYN f/u.  Patient agrees to keep transplant dept updated.  Will schedule at [facility] if unable to schedule locally.

## 2024-12-21 ENCOUNTER — PATIENT MESSAGE (OUTPATIENT)
Dept: TRANSPLANT | Facility: CLINIC | Age: 64
End: 2024-12-21
Payer: COMMERCIAL

## 2024-12-23 ENCOUNTER — TELEPHONE (OUTPATIENT)
Dept: TRANSPLANT | Facility: CLINIC | Age: 64
End: 2024-12-23
Payer: COMMERCIAL

## 2024-12-23 NOTE — TELEPHONE ENCOUNTER
----- Message from Med Assistant Kitchen sent at 12/17/2024 10:18 AM CST -----  Will f/u on results.    =====================================================    ----- Message -----  From: Loren Peralta  Sent: 12/17/2024  10:18 AM CST  To: Karthikeyan Yusuf Staff    Type: General Call Back     Name of Caller:KEVAN AMBROCIO [[id number]]    Reason for call back?:appointment update    Best Call Back Number: [phone]    Additional Information: patient states she had spoken with a coordinator earlier and would like to get back in touch with her if possible. Patient states she was speaking with Lynn. Patient states she would like to notify the representative she scheduled an appointment with the OBGYN 12/18 for 8:00AM. Please call patient back with further information if needed.

## 2025-01-02 ENCOUNTER — TELEPHONE (OUTPATIENT)
Dept: TRANSPLANT | Facility: CLINIC | Age: 65
End: 2025-01-02
Payer: COMMERCIAL

## 2025-01-02 NOTE — TELEPHONE ENCOUNTER
"Ms Austin reports all pending tests have been completed at [facility]. Says she's pending 2-vaccines, which will be completed within the next 2-weeks. Ms Austin says she's able to assist with getting any outstanding tests we may be having trouble getting.     Advised I will notify her coordinator. Understanding expressed.     ----- Message from Saeed sent at 1/2/2025  1:44 PM CST -----  Regarding: call back  Consult/Advisory:        Name Of Caller: Self     Contact Preference?:[phone]     What is the nature of the call?: Calling to speak w/  James in regards to her finish her test also pt would like to know if James was able to connect with the other hospital requesting call back       Additional Notes:  "Thank you for all that you do for our patients"  "

## 2025-01-06 ENCOUNTER — DOCUMENTATION ONLY (OUTPATIENT)
Dept: TRANSPLANT | Facility: CLINIC | Age: 65
End: 2025-01-06
Payer: COMMERCIAL

## 2025-01-10 ENCOUNTER — TELEPHONE (OUTPATIENT)
Dept: TRANSPLANT | Facility: CLINIC | Age: 65
End: 2025-01-10
Payer: COMMERCIAL

## 2025-01-10 NOTE — TELEPHONE ENCOUNTER
Spoke w/ patient regarding pending tests to complete transplant evaluation.  Patient reports that all tests are complete.  Called Dr. Novoa's office for results. Message left w/ his nurse requesting results of colonoscopy, T spot, and CT of chest.  Fax and phone number provided.

## 2025-01-14 ENCOUNTER — TELEPHONE (OUTPATIENT)
Dept: TRANSPLANT | Facility: CLINIC | Age: 65
End: 2025-01-14
Payer: COMMERCIAL

## 2025-01-14 NOTE — TELEPHONE ENCOUNTER
"Patient's phone call returned.  Patient states that she wanted to touch bases prior to presentation in the liver meeting tomorrow.  Explained the process.  She voiced understanding.  Will update patient after the meeting w/ plan/ recommendations.  Patient voiced understanding that transplant nurse is awaiting records of GYN consult and mammogram.  Both requested.  Patient aware that she will likely need to repeat pap smear.  Patient seen by GYN last month, but cannot remember when/ where pap done.    During call,connection lost x2.  Attempted to call patient back but going straight to .  Message left instructing her to call back if she has any questions or needs any further assistance.    ==========================================================      ----- Message from Saeed sent at 1/14/2025  9:24 AM CST -----  Regarding: call back  Consult/Advisory:        Name Of Caller: Self     Contact Preference?:[phone]     What is the nature of the call?: Calling to speak w/  James in regards to updating her on some info requesting call back       Additional Notes:  "Thank you for all that you do for our patients"  "

## 2025-01-14 NOTE — TELEPHONE ENCOUNTER
"Patient's phone call returned.  Patient reports that GYN appt scheduled for pap smear.  Patient previously reported concerns r/t pap smear since she is not sexually active and it is extremely uncomfortable, but was able to identify a gynecologist who can accommodate her and take above into consideration.    ================================================================    ----- Message from Saeed sent at 1/14/2025 12:33 PM CST -----  Regarding: call back  Consult/Advisory:        Name Of Caller: Self     Contact Preference?:[phone]     What is the nature of the call?: Calling to speak will/  James in regards to an earlier call  her OBGYN appt is 01/23 at 10:45 am     Additional Notes:  "Thank you for all that you do for our patients"  "

## 2025-01-15 ENCOUNTER — COMMITTEE REVIEW (OUTPATIENT)
Dept: TRANSPLANT | Facility: CLINIC | Age: 65
End: 2025-01-15
Payer: COMMERCIAL

## 2025-01-16 ENCOUNTER — TELEPHONE (OUTPATIENT)
Dept: TRANSPLANT | Facility: CLINIC | Age: 65
End: 2025-01-16
Payer: COMMERCIAL

## 2025-01-16 DIAGNOSIS — E88.09 ALPHA-1-ANTICHYMOTRYPSIN DEFICIENCY: ICD-10-CM

## 2025-01-16 DIAGNOSIS — J43.8 EMPHYSEMA DUE TO ALPHA-1-ANTITRYPSIN DEFICIENCY: Primary | ICD-10-CM

## 2025-01-16 DIAGNOSIS — J43.9 PULMONARY EMPHYSEMA, UNSPECIFIED EMPHYSEMA TYPE: ICD-10-CM

## 2025-01-16 DIAGNOSIS — Z76.82 ORGAN TRANSPLANT CANDIDATE: ICD-10-CM

## 2025-01-16 DIAGNOSIS — E88.01 EMPHYSEMA DUE TO ALPHA-1-ANTITRYPSIN DEFICIENCY: Primary | ICD-10-CM

## 2025-01-16 NOTE — TELEPHONE ENCOUNTER
Orders received from Dr. Saldivar.   Appointments for testing and the consult visit with Dr. Saldivar have been scheduled on 2/5/25, as the patient will be here that day for an appointment with Dr. Napier.  Medical assistant Nadia Bunn called patient to review the appointments.        ----- Message from Daniela Saldivar DO sent at 1/16/2025 12:44 PM CST -----  Regarding: RE: Liver TX Eval Referral    Yes that's fine for me to see her.  Will need CT chest and 6MWT.  Thanks    ----- Message -----  From: Nadia Bunn MA  Sent: 1/16/2025  10:56 AM CST  To: Tonya Bond RN  Subject: Liver TX Eval Referral                           Dr. Stalin Higgins is asking for Roosevelt Lowe (MRN [id number]) to be seen by Yariel Rose. She has h/o Alpha i antitrypsin def and emphysema.

## 2025-01-16 NOTE — COMMITTEE REVIEW
Ami Austin's case presented to selection committee.  Patient has been deferred for liver transplant. Patient needs consult with Dr Saldivar in Pulmonology. Mr. Austin has h/o emphysema.   I was present at the committee meeting and attest to the decision of the committee.    Malik Narayan  01/16/2025

## 2025-01-16 NOTE — TELEPHONE ENCOUNTER
Called patient to inform her that her case was presented in liver selection com meeting on yesterday.  Informed patient that she was deferred for transplant pending consult w/ Dr. Saldivar for emphysema/ A1AT deficiency.   She voiced understanding.  All questions/ concerns regarding appts and timeline of representation, answered/ addressed.  Understanding voiced.

## 2025-01-17 DIAGNOSIS — K74.69 DECOMPENSATED LIVER DISEASE: ICD-10-CM

## 2025-01-17 DIAGNOSIS — Z76.82 ORGAN TRANSPLANT CANDIDATE: Primary | ICD-10-CM

## 2025-01-22 ENCOUNTER — PATIENT MESSAGE (OUTPATIENT)
Dept: TRANSPLANT | Facility: CLINIC | Age: 65
End: 2025-01-22
Payer: COMMERCIAL

## 2025-01-23 ENCOUNTER — TELEPHONE (OUTPATIENT)
Dept: TRANSPLANT | Facility: CLINIC | Age: 65
End: 2025-01-23
Payer: COMMERCIAL

## 2025-01-23 NOTE — TELEPHONE ENCOUNTER
Returned pt's phone call.  No answer at this time.  Message left on VM.  Return phone call requested.  Contact info provided.  Awaiting callback.    ==========================================================      ----- Message from Yani sent at 1/23/2025  2:48 PM CST -----  Regarding: Nurse Bailey  Contact: Patient can be contacted @# [phone]  PT is calling states she is still trying to get her CT scan imagining pictures  PT is wondering about having another paracentesis in her [place] location  Has been going 17-18 days between paracentesis. If PT can have on 1/31 would be 17 days, please advise    Patient can be contacted @# [phone]

## 2025-01-24 ENCOUNTER — TELEPHONE (OUTPATIENT)
Dept: TRANSPLANT | Facility: CLINIC | Age: 65
End: 2025-01-24
Payer: COMMERCIAL

## 2025-01-24 NOTE — TELEPHONE ENCOUNTER
Patient's phone call returned.  Clarified tests scheduled 2/5.  Informed her that outside CT of chest results received and will be sent to Lung team for review to see rather repeat CT needed.  She voiced understanding and reports that she will be bringing CD of images on 2/5 when [place] [facility] [place].  Will cancel CT of chest if not needed.  Patient also reports that GYN consult rescheduled to 2/2 d/t weather.  Patient also states that all recommended vaccines complete (next dose of shingles vax due in 6 months).  During call patient also asking if ok to have paracentesis prior to appts scheduled week of 2/2.  Informed patient that it's ok to proceed w/ scheduling and should not interfere w/ results.  Patient reports that she will contact Dr. Novoa's office to schedule.  Will f/u w/ patient on next week regarding CT of chest.    ========================================================    ----- Message from Gracie Silva sent at 1/24/2025  2:49 PM CST -----  Regarding: FW: return call Nurse Bailey  Contact: Patient can be contacted @# [phone]    ----- Message -----  From: Yani Petty  Sent: 1/24/2025   2:05 PM CST  To: [facility]  Subject: return call Nurse Bailey                          PT called returning call to Nurse Bailey. Please call at your convenience    Patient can be contacted @# [phone]

## 2025-01-31 ENCOUNTER — TELEPHONE (OUTPATIENT)
Dept: TRANSPLANT | Facility: CLINIC | Age: 65
End: 2025-01-31
Payer: COMMERCIAL

## 2025-01-31 NOTE — TELEPHONE ENCOUNTER
Called patient to provide update on appts.  Patient scheduled for CT of chest at [facility] 2/5 but CT of chest done locally 12/2024.  Outside report received and patient states that she will bring a disc of the imaging on next week while on campus for other appts.  Above reviewed w/ Lung team.  If the patient can bring a disc with the dedicated CT chest then it does not need to be repeated. Informed patient.  Patient confirms that she picked up CD of chest CT and will bring it w/ her to [facility] next week.  CT of chest scheduled 2/5 cancelled.

## 2025-02-04 ENCOUNTER — TELEPHONE (OUTPATIENT)
Dept: TRANSPLANT | Facility: CLINIC | Age: 65
End: 2025-02-04
Payer: COMMERCIAL

## 2025-02-05 ENCOUNTER — HOSPITAL ENCOUNTER (OUTPATIENT)
Dept: PULMONOLOGY | Facility: CLINIC | Age: 65
Discharge: HOME | End: 2025-02-05
Payer: COMMERCIAL

## 2025-02-05 ENCOUNTER — OFFICE VISIT (OUTPATIENT)
Dept: TRANSPLANT | Facility: CLINIC | Age: 65
End: 2025-02-05
Payer: COMMERCIAL

## 2025-02-05 ENCOUNTER — LAB VISIT (OUTPATIENT)
Dept: LAB | Facility: HOSPITAL | Age: 65
End: 2025-02-05
Attending: INTERNAL MEDICINE
Payer: COMMERCIAL

## 2025-02-05 VITALS
OXYGEN SATURATION: 97 % | SYSTOLIC BLOOD PRESSURE: 98 MMHG | TEMPERATURE: 98 F | BODY MASS INDEX: 17.89 KG/M2 | HEART RATE: 102 BPM | WEIGHT: 101 LBS | RESPIRATION RATE: 16 BRPM | HEIGHT: 63 IN | DIASTOLIC BLOOD PRESSURE: 63 MMHG

## 2025-02-05 VITALS — HEIGHT: 63 IN | BODY MASS INDEX: 18.08 KG/M2 | WEIGHT: 102.06 LBS

## 2025-02-05 VITALS
OXYGEN SATURATION: 95 % | HEART RATE: 101 BPM | DIASTOLIC BLOOD PRESSURE: 64 MMHG | BODY MASS INDEX: 18.08 KG/M2 | WEIGHT: 102.06 LBS | RESPIRATION RATE: 16 BRPM | TEMPERATURE: 97 F | HEIGHT: 63 IN | SYSTOLIC BLOOD PRESSURE: 100 MMHG

## 2025-02-05 DIAGNOSIS — K74.69 DECOMPENSATED LIVER DISEASE: ICD-10-CM

## 2025-02-05 DIAGNOSIS — E88.01 EMPHYSEMA DUE TO ALPHA-1-ANTITRYPSIN DEFICIENCY: ICD-10-CM

## 2025-02-05 DIAGNOSIS — R18.8 ASCITES OF LIVER: ICD-10-CM

## 2025-02-05 DIAGNOSIS — M62.84 SARCOPENIA: ICD-10-CM

## 2025-02-05 DIAGNOSIS — J43.8 EMPHYSEMA DUE TO ALPHA-1-ANTITRYPSIN DEFICIENCY: ICD-10-CM

## 2025-02-05 DIAGNOSIS — Z83.49 FAMILY HISTORY OF ALPHA 1 ANTITRYPSIN DEFICIENCY: ICD-10-CM

## 2025-02-05 DIAGNOSIS — K42.9 UMBILICAL HERNIA WITHOUT OBSTRUCTION AND WITHOUT GANGRENE: ICD-10-CM

## 2025-02-05 DIAGNOSIS — K76.6 PORTAL HYPERTENSION: ICD-10-CM

## 2025-02-05 DIAGNOSIS — I85.11 SECONDARY ESOPHAGEAL VARICES WITH BLEEDING: ICD-10-CM

## 2025-02-05 DIAGNOSIS — E88.09 ALPHA-1-ANTICHYMOTRYPSIN DEFICIENCY: ICD-10-CM

## 2025-02-05 DIAGNOSIS — Z76.82 ORGAN TRANSPLANT CANDIDATE: ICD-10-CM

## 2025-02-05 DIAGNOSIS — Z86.39 HISTORY OF ALPHA-1-ANTITRYPSIN DEFICIENCY: ICD-10-CM

## 2025-02-05 DIAGNOSIS — Z01.818 ENCOUNTER FOR PRE-TRANSPLANT EVALUATION FOR LIVER TRANSPLANT: ICD-10-CM

## 2025-02-05 DIAGNOSIS — J43.9 PULMONARY EMPHYSEMA, UNSPECIFIED EMPHYSEMA TYPE: ICD-10-CM

## 2025-02-05 LAB
ALBUMIN SERPL BCP-MCNC: 3.2 G/DL (ref 3.5–5.2)
ALP SERPL-CCNC: 278 U/L (ref 40–150)
ALT SERPL W/O P-5'-P-CCNC: 53 U/L (ref 10–44)
ANION GAP SERPL CALC-SCNC: 9 MMOL/L (ref 8–16)
AST SERPL-CCNC: 61 U/L (ref 10–40)
BASOPHILS # BLD AUTO: 0.03 K/UL (ref 0–0.2)
BASOPHILS NFR BLD: 0.4 % (ref 0–1.9)
BILIRUB SERPL-MCNC: 2.9 MG/DL (ref 0.1–1)
BUN SERPL-MCNC: 26 MG/DL (ref 8–23)
CALCIUM SERPL-MCNC: 9.4 MG/DL (ref 8.7–10.5)
CHLORIDE SERPL-SCNC: 100 MMOL/L (ref 95–110)
CO2 SERPL-SCNC: 23 MMOL/L (ref 23–29)
CREAT SERPL-MCNC: 0.7 MG/DL (ref 0.5–1.4)
DIFFERENTIAL METHOD BLD: ABNORMAL
EOSINOPHIL # BLD AUTO: 0.1 K/UL (ref 0–0.5)
EOSINOPHIL NFR BLD: 0.8 % (ref 0–8)
ERYTHROCYTE [DISTWIDTH] IN BLOOD BY AUTOMATED COUNT: 15.2 % (ref 11.5–14.5)
EST. GFR  (NO RACE VARIABLE): >60 ML/MIN/1.73 M^2
GLUCOSE SERPL-MCNC: 100 MG/DL (ref 70–110)
HCT VFR BLD AUTO: 39 % (ref 37–48.5)
HGB BLD-MCNC: 12.9 G/DL (ref 12–16)
IMM GRANULOCYTES # BLD AUTO: 0.02 K/UL (ref 0–0.04)
IMM GRANULOCYTES NFR BLD AUTO: 0.3 % (ref 0–0.5)
INR PPP: 1 (ref 0.8–1.2)
LYMPHOCYTES # BLD AUTO: 0.8 K/UL (ref 1–4.8)
LYMPHOCYTES NFR BLD: 11.2 % (ref 18–48)
MCH RBC QN AUTO: 34.4 PG (ref 27–31)
MCHC RBC AUTO-ENTMCNC: 33.1 G/DL (ref 32–36)
MCV RBC AUTO: 104 FL (ref 82–98)
MONOCYTES # BLD AUTO: 0.6 K/UL (ref 0.3–1)
MONOCYTES NFR BLD: 8 % (ref 4–15)
NEUTROPHILS # BLD AUTO: 5.8 K/UL (ref 1.8–7.7)
NEUTROPHILS NFR BLD: 79.3 % (ref 38–73)
NRBC BLD-RTO: 0 /100 WBC
PLATELET # BLD AUTO: 206 K/UL (ref 150–450)
PMV BLD AUTO: 8.9 FL (ref 9.2–12.9)
POTASSIUM SERPL-SCNC: 3.4 MMOL/L (ref 3.5–5.1)
PROT SERPL-MCNC: 7.4 G/DL (ref 6–8.4)
PROTHROMBIN TIME: 10.8 SEC (ref 9–12.5)
RBC # BLD AUTO: 3.75 M/UL (ref 4–5.4)
SODIUM SERPL-SCNC: 132 MMOL/L (ref 136–145)
WBC # BLD AUTO: 7.26 K/UL (ref 3.9–12.7)

## 2025-02-05 PROCEDURE — 94618 PULMONARY STRESS TESTING: CPT | Mod: S$GLB,TXP,, | Performed by: INTERNAL MEDICINE

## 2025-02-05 PROCEDURE — 3078F DIAST BP <80 MM HG: CPT | Mod: CPTII,S$GLB,, | Performed by: PHYSICIAN ASSISTANT

## 2025-02-05 PROCEDURE — 3074F SYST BP LT 130 MM HG: CPT | Mod: CPTII,S$GLB,TXP, | Performed by: INTERNAL MEDICINE

## 2025-02-05 PROCEDURE — 3074F SYST BP LT 130 MM HG: CPT | Mod: CPTII,S$GLB,, | Performed by: PHYSICIAN ASSISTANT

## 2025-02-05 PROCEDURE — 3078F DIAST BP <80 MM HG: CPT | Mod: CPTII,S$GLB,TXP, | Performed by: INTERNAL MEDICINE

## 2025-02-05 PROCEDURE — 85025 COMPLETE CBC W/AUTO DIFF WBC: CPT | Mod: TXP | Performed by: INTERNAL MEDICINE

## 2025-02-05 PROCEDURE — 1159F MED LIST DOCD IN RCRD: CPT | Mod: CPTII,S$GLB,, | Performed by: PHYSICIAN ASSISTANT

## 2025-02-05 PROCEDURE — 99215 OFFICE O/P EST HI 40 MIN: CPT | Mod: S$GLB,TXP,, | Performed by: INTERNAL MEDICINE

## 2025-02-05 PROCEDURE — 99204 OFFICE O/P NEW MOD 45 MIN: CPT | Mod: 25,S$GLB,, | Performed by: PHYSICIAN ASSISTANT

## 2025-02-05 PROCEDURE — 99999 PR PBB SHADOW E&M-EST. PATIENT-LVL IV: CPT | Mod: PBBFAC,TXP,, | Performed by: INTERNAL MEDICINE

## 2025-02-05 PROCEDURE — 1160F RVW MEDS BY RX/DR IN RCRD: CPT | Mod: CPTII,S$GLB,TXP, | Performed by: INTERNAL MEDICINE

## 2025-02-05 PROCEDURE — 1160F RVW MEDS BY RX/DR IN RCRD: CPT | Mod: CPTII,S$GLB,, | Performed by: PHYSICIAN ASSISTANT

## 2025-02-05 PROCEDURE — 80053 COMPREHEN METABOLIC PANEL: CPT | Mod: TXP | Performed by: INTERNAL MEDICINE

## 2025-02-05 PROCEDURE — 3008F BODY MASS INDEX DOCD: CPT | Mod: CPTII,S$GLB,TXP, | Performed by: INTERNAL MEDICINE

## 2025-02-05 PROCEDURE — 3008F BODY MASS INDEX DOCD: CPT | Mod: CPTII,S$GLB,, | Performed by: PHYSICIAN ASSISTANT

## 2025-02-05 PROCEDURE — 85610 PROTHROMBIN TIME: CPT | Mod: TXP | Performed by: INTERNAL MEDICINE

## 2025-02-05 PROCEDURE — 1159F MED LIST DOCD IN RCRD: CPT | Mod: CPTII,S$GLB,TXP, | Performed by: INTERNAL MEDICINE

## 2025-02-05 PROCEDURE — 99999 PR PBB SHADOW E&M-EST. PATIENT-LVL IV: CPT | Mod: PBBFAC,,, | Performed by: INTERNAL MEDICINE

## 2025-02-05 PROCEDURE — 36415 COLL VENOUS BLD VENIPUNCTURE: CPT | Mod: TXP | Performed by: INTERNAL MEDICINE

## 2025-02-05 RX ORDER — POTASSIUM CHLORIDE 20 MEQ/1
20 TABLET, EXTENDED RELEASE ORAL DAILY
COMMUNITY

## 2025-02-05 NOTE — PROGRESS NOTES
Transplant Hepatology   Transplant Evaluation Follow Up Note    Referring provider: No ref. provider found  PCP: No, Primary Doctor    Chief complaint: follow up of A1AT deficiency related cirrhosis    HPI: Ami Austin is a 64 y.o. female who presents to Transplant Hepatology Clinic for follow up of A1AT deficiency related cirrhosis. She is accompanied by her , Eric.    Last seen in Transplant Hepatology Clinic on 8/12/2024.    Interval Events:   - Feels ok today.   - Has been undergoing paracentesis ~2 weeks with 5-10L removed.  - Taking Lasix 20mg qd, Aldactone 50mg qd. Adhering to low salt diet.  - Working on a high protein diet. Thinks appetite has improved since last visit.  - Denies recent hospitalizations. Went to ED for hypoK after colonoscopy in Nov 2024.  - She denies signs of encephalopathy, jaundice, GI bleeding.      Past Medical History:   Diagnosis Date    Alpha-1-antitrypsin deficiency     Ascites     Emphysema, unspecified     Esophageal varices     Portal hypertension     Splenomegaly     Unspecified cirrhosis of liver        Past Surgical History:   Procedure Laterality Date    ESOPHAGOGASTRODUODENOSCOPY  06/18/2024    Grade III esophageal varices.    MECKEL DIVERTICULUM EXCISION      OVARIAN CYST REMOVAL         No family history on file.    Social History     Tobacco Use    Smoking status: Never     Passive exposure: Past    Smokeless tobacco: Never   Substance Use Topics    Alcohol use: Not Currently    Drug use: Never       Current Outpatient Medications   Medication Sig Dispense Refill    albuterol (PROVENTIL/VENTOLIN HFA) 90 mcg/actuation inhaler 2 puffs 4 (four) times daily as needed.      famotidine (PEPCID) 40 MG tablet Take 40 mg by mouth once daily.      furosemide (LASIX) 20 MG tablet Take 20 mg by mouth 2 (two) times daily.      pantoprazole (PROTONIX) 40 MG tablet Take 40 mg by mouth every evening.      potassium chloride SA (K-DUR,KLOR-CON) 20 MEQ tablet Take 20 mEq by  "mouth once daily.      spironolactone (ALDACTONE) 50 MG tablet Take 50 mg by mouth 2 (two) times daily.      SYMBICORT 160-4.5 mcg/actuation HFAA Inhale 2 puffs into the lungs every 12 (twelve) hours.       No current facility-administered medications for this visit.       Review of patient's allergies indicates:   Allergen Reactions    Ephedrine Other (See Comments)     Hyperexcitability    House dust mite     Egg derived Hives and Nausea And Vomiting            Vitals:    02/05/25 1107   BP: 100/64   Pulse: 101   Resp: 16   Temp: 97.3 °F (36.3 °C)   TempSrc: Temporal   SpO2: 95%   Weight: 46.3 kg (102 lb 1.2 oz)   Height: 5' 3" (1.6 m)   Body mass index is 18.08 kg/m².    Physical Exam  Constitutional:       General: She is not in acute distress.     Appearance: She is underweight. She is not toxic-appearing.   Eyes:      General: No scleral icterus.  Cardiovascular:      Rate and Rhythm: Tachycardia present.   Pulmonary:      Effort: Pulmonary effort is normal.   Abdominal:      General: Abdomen is protuberant. There is no distension.      Palpations: There is fluid wave.      Tenderness: There is no abdominal tenderness.      Hernia: A hernia (Easily reducible, nontender to palpation.) is present. Hernia is present in the umbilical area.   Musculoskeletal:         General: No swelling.   Skin:     General: Skin is warm.      Coloration: Skin is not jaundiced.   Neurological:      General: No focal deficit present.      Mental Status: She is alert and oriented to person, place, and time.      Comments: No asterixis.   Psychiatric:         Thought Content: Thought content normal.         LABS: I personally reviewed pertinent laboratory findings.    Lab Results   Component Value Date    ALT 53 (H) 02/05/2025    AST 61 (H) 02/05/2025     (H) 11/21/2024    ALKPHOS 278 (H) 02/05/2025    BILITOT 2.9 (H) 02/05/2025       Lab Results   Component Value Date    WBC 7.26 02/05/2025    HGB 12.9 02/05/2025    HCT 39.0 " "02/05/2025     (H) 02/05/2025     02/05/2025       Lab Results   Component Value Date     (L) 02/05/2025    K 3.4 (L) 02/05/2025     02/05/2025    CO2 23 02/05/2025    BUN 26 (H) 02/05/2025    CREATININE 0.7 02/05/2025    CALCIUM 9.4 02/05/2025    ANIONGAP 9 02/05/2025       Lab Results   Component Value Date    INR 1.0 02/05/2025    INR 1.0 11/21/2024    INR 1.0 10/25/2024       No results found for: "SMOOTHMUSCAB", "MITOAB"    Lab Results   Component Value Date    IRON 140 11/21/2024    TIBC 377 11/21/2024    FERRITIN 121 11/21/2024       Lab Results   Component Value Date    HEPBCAB Non-reactive 11/21/2024    HEPCAB Non-reactive 11/21/2024         No results found for: "TREVOR"    Imaging:   I personally reviewed recent imaging studies available on the chart and from outside medical records.      Assessment:  64 y.o. female with A1AT deficiency related cirrhosis. She is decompensated with ascites, history of variceal bleeding.    1. History of alpha-1-antitrypsin deficiency    2. Decompensated liver disease    3. Portal hypertension    4. Secondary esophageal varices with bleeding    5. Ascites of liver    6. Emphysema due to alpha-1-antitrypsin deficiency    7. Family history of alpha 1 antitrypsin deficiency    8. Umbilical hernia without obstruction and without gangrene    9. Sarcopenia    10. Encounter for pre-transplant evaluation for liver transplant          MELD 3.0: 16 at 2/5/2025  9:18 AM  MELD-Na: 10 at 2/5/2025  9:18 AM  Calculated from:  Serum Creatinine: 0.7 mg/dL (Using min of 1 mg/dL) at 2/5/2025  9:18 AM  Serum Sodium: 132 mmol/L at 2/5/2025  9:18 AM  Total Bilirubin: 2.9 mg/dL at 2/5/2025  9:18 AM  Serum Albumin: 3.2 g/dL at 2/5/2025  9:18 AM  INR(ratio): 1 at 2/5/2025  9:18 AM  Age at listing (hypothetical): 64 years  Sex: Female at 2/5/2025  9:18 AM      SUMMEROS Patient Status  Functional Status: 50% - Requires considerable assistance and frequent medical care  Physical " Capacity: Limited Mobility    Transplant Candidacy: Patient is a 64 y.o. female with end-stage liver disease secondary to A1AT deficiency related cirrhosis with MELD 3.0 16 here for evaluation for possible OLT. Based on available information, she is a potential liver transplant candidate. She is undergoing liver transplant evaluation. She will be presented to Liver Transplant Selection Committee after all tests and evaluations are complete.    Recommendations:  Ascites/Edema:  Ascites+. Lasix 20mg qd, increase to Aldactone 100mg qd. Requiring paracenteses every 2 weeks with 5-10L removed. Therapeutic paracentesis as needed, limit to 5L at a time. Briefly discussed the possibility of TIPS. Na 2 g diet.    Encephalopathy: Not an active issue.    Variceal screening: Outside EGD in June 2024 large EV treated with esophageal banding.     HCC screening: CT TP w/o HCC 11/22/24. AFP 3.6 11/21/24. Repeat abdominal US and AFP every 6 months.     Immunizations: Not immune to HAV or HBV. Recommend vaccination for both    CRC screening: Colonoscopy in Nov 2024 with 10 subcent polyps, 9 adenomas. Recommend repeat colonoscopy in 1 year.    Sarcopenia: BMI 17.8. This is my main concern regarding liver transplant at this time. Counseled regarding high protein diet, protein shakes, bedtime snack.     Emphysema: Scheduled to see [facility] 2/5/25 as part of transplant evaluation. Does not require home oxygen at this time.     Umbilical Hernia: Small. Easily reducible. Recommend wearing abdominal binder to help prevent incarceration.     Living Donation: Reports several relatives have come forward as potential living donors.    Return to clinic in 2 months.    I have sent communication to the referring physician and/or primary care provider.    Madelin Napier MD  Staff Physician  Hepatology and Liver Transplant  [facility]  [facility] [place]

## 2025-02-05 NOTE — PROCEDURES
Ami Austin is a 64 y.o.   female patient, who presents for a 6 minute walk test ordered by DO Yariel.  The diagnosis is Alpha-1 Antitrypsin Deficiency; Emphysema.  The patient's BMI is 18.1 kg/m2.  Predicted distance (lower limit of normal) is 391.94 meters.      Test Results:    The test was completed without stopping.  The total time walked was 360 seconds.  During walking, the patient reported:  No complaints.  The patient used no assistive devices during testing.     02/05/2025---------Distance: 243.84 meters (800 feet)     Lap Walk Time O2 Sat % Supplemental Oxygen Heart Rate Blood Pressure Ronal Scale Comment   Pre-exercise  (Resting) 0 0 95 % Room Air 101 bpm 103/69 mmHg 0    During Exercise 1 94 sec 94 % Room Air 104 bpm      During Exercise 2 190 sec 94 % Room Air 105 bpm      During Exercise 3 283 sec 94 % Room Air 101 bpm      End of Exercise 4 360 sec 95 % Room Air 100 bpm 90/56 mmHg 0    Post-exercise  (Recovery)   95 % Room Air  99 bpm        Recovery Time: 48 seconds    Performing nurse/tech: Sabino PÉREZ      PREVIOUS STUDY:   The patient has not had a previous study.      CLINICAL INTERPRETATION:  Six minute walk distance is 243.84 meters (800 feet) with no dyspnea.  During exercise, there was no significant desaturation while breathing room air.  Blood pressure decreased significantly and Heart rate remained stable with walking.  Tachycardia was present prior to exercise.  The patient did not report non-pulmonary symptoms during exercise.  Significant exercise impairment is likely due to cardiovascular causes and subjective symptoms.  The patient did complete the study, walking 360 seconds of the 360 second test.  No previous study performed.  Based upon age and body mass index, exercise capacity is less than predicted.

## 2025-02-05 NOTE — LETTER
February 5, 2025        Sj Novoa  [address] [facility] [address]  Phone: [phone]  Fax: [phone]             [facility]  [address]  [facility] [address]  Phone: [phone]   Patient: Ami Austin   MR Number: [id number]   YOB: 1960   Date of Visit: 2/5/2025       Dear Dr. Sj Novoa    Thank you for referring Ami Austin to me for evaluation. Attached you will find relevant portions of my assessment and plan of care.    If you have questions, please do not hesitate to call me. I look forward to following Ami Austin along with you.    Sincerely,    Madelin Napier MD    Enclosure    If you would like to receive this communication electronically, please contact [email or web address] or [phone] to request eTobb Link access.    e[employer]obb Link is a tool which provides read-only access to select patient information with whom you have a relationship. Its easy to use and provides real time access to review your patients record including encounter summaries, notes, results, and demographic information.    If you feel you have received this communication in error or would no longer like to receive these types of communications, please e-mail [email or web address]

## 2025-02-05 NOTE — Clinical Note
Increase Aldactone to 100mg qd. Pt only taking 50mg qd. Please check alpha 1 antitrypsin phenotype with next set of labs. Please share Manjinder's info with patient. She has potential living donors. RTC in 2 months.

## 2025-02-05 NOTE — PROGRESS NOTES
ADVANCED LUNG DISEASE CLINIC INITIAL EVALUATION                                                                                                                                             Reason for Visit:  Evaluation for liver transplant clearance    Referring Physician: Madelin Napier MD    History of Present Illness: Ami Austin is a 64 y.o. female who is on 0L of oxygen.  She is on no assisted ventilation.  Her New York Heart Association Class is II and a Karnofsky score of 90% - Able to carry on normal activity: minor symptoms of disease. She is not diabetic.    Requires Supplemental O2: No  Massive Hemoptysis: 0 occurrences  Exacerbations: 0 occurrences  Microbiology Infections: No    Patient presents today for pulmonary clearance for liver transplant. Patient has a history of A1AT deficiency with ZZ phenotype. Currently undergoing evaluation for liver transplant and found to have emphysematous changes on CT abdomen. Also noted to have a RLL cavitary lesions and multiple pulmonary nodules throughout. Unable to view updated CT chest today.     Patient states she was first diagnosed with A1AT when her siblings developed symptoms in their 30s. At the time, she was diagnosed in [place] and was not started on treatment. She was instructed to avoid drinking and smoking. Her sister has required multiple liver transplants and is now undergoing kidney transplant evaluation. Her brother passed away in his 50s and father in his 70s from complications of A1AT. Patient states she had remained stable for years. She did not develop manifestations of her A1AT until last summer 2024 when she developed ascites. She required multiple paracenteses (up to 10L taken off each time) and was eventually referred to [facility] for liver transplant evaluation. She states she only experiences dyspnea with very heavy exertion and when she has worsening ascites. No history of lung biopsy/chest surgeries. No history of hospital  admissions/intubation for respiratory exacerbations.     Patient is a never smoker. She states she had some mold exposures in her mother in laws home when she and her  first moved to [place]. Eventually had mold remediation. No history of birds for pets, chemical/dust exposures, farm animals, etc. 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 Uses inhalers every morning. Reports cough, post-nasal drip on a daily basis. Presents to the clinic with her .       Past Medical History:   Diagnosis Date    Alpha-1-antitrypsin deficiency     Ascites     Emphysema, unspecified     Esophageal varices     Portal hypertension     Splenomegaly     Unspecified cirrhosis of liver        Past Surgical History:   Procedure Laterality Date    ESOPHAGOGASTRODUODENOSCOPY  06/18/2024    Grade III esophageal varices.    MECKEL DIVERTICULUM EXCISION      OVARIAN CYST REMOVAL         Allergies: Ephedrine, House dust mite, and Egg derived    Current Outpatient Medications   Medication Sig    albuterol (PROVENTIL/VENTOLIN HFA) 90 mcg/actuation inhaler 2 puffs 4 (four) times daily as needed.    famotidine (PEPCID) 40 MG tablet Take 40 mg by mouth once daily.    furosemide (LASIX) 20 MG tablet Take 20 mg by mouth 2 (two) times daily.    pantoprazole (PROTONIX) 40 MG tablet Take 40 mg by mouth every evening.    potassium chloride SA (K-DUR,KLOR-CON) 20 MEQ tablet Take 20 mEq by mouth once daily.    spironolactone (ALDACTONE) 50 MG tablet Take 50 mg by mouth 2 (two) times daily.    SYMBICORT 160-4.5 mcg/actuation HFAA Inhale 2 puffs into the lungs every 12 (twelve) hours.     No current facility-administered medications for this visit.       Immunization History   Administered Date(s) Administered    Hepatitis B (recombinant) Adjuvanted, 2 dose 11/22/2024    Pneumococcal Conjugate - 20 Valent 11/22/2024     Family History:  No family history on  file.  Social History     Substance and Sexual Activity   Alcohol Use Not Currently      Social History     Substance and Sexual Activity   Drug Use Never      Social History     Socioeconomic History    Marital status:    Tobacco Use    Smoking status: Never     Passive exposure: Past    Smokeless tobacco: Never   Substance and Sexual Activity    Alcohol use: Not Currently    Drug use: Never    Sexual activity: Yes     Partners: Male     Social Drivers of Health     Financial Resource Strain: Low Risk  (8/5/2024)    Overall Financial Resource Strain (CARDIA)     Difficulty of Paying Living Expenses: Not very hard   Food Insecurity: No Food Insecurity (8/5/2024)    Hunger Vital Sign     Worried About Running Out of Food in the Last Year: Never true     Ran Out of Food in the Last Year: Never true   Physical Activity: Insufficiently Active (8/5/2024)    Exercise Vital Sign     Days of Exercise per Week: 1 day     Minutes of Exercise per Session: 10 min   Stress: No Stress Concern Present (8/5/2024)    [employer] [place] [employer] - Occupational Stress Questionnaire     Feeling of Stress : Only a little   Housing Stability: Unknown (8/5/2024)    Housing Stability Vital Sign     Unable to Pay for Housing in the Last Year: No     Review of Systems   Constitutional:  Positive for malaise/fatigue. Negative for chills, diaphoresis, fever and weight loss.   HENT:  Positive for congestion. Negative for ear discharge, ear pain, hearing loss, nosebleeds, sinus pain, sore throat and tinnitus.    Eyes:  Negative for blurred vision, double vision, photophobia, pain, discharge and redness.   Respiratory:  Positive for cough, sputum production and shortness of breath (only with ascites or heavy exertion). Negative for hemoptysis, wheezing and stridor.    Cardiovascular:  Negative for chest pain, palpitations, orthopnea, claudication, leg swelling and PND.   Gastrointestinal:  Negative for abdominal pain, blood in  "stool, constipation, diarrhea, heartburn, melena, nausea and vomiting.   Genitourinary:  Negative for dysuria, flank pain, frequency, hematuria and urgency.   Musculoskeletal:  Negative for back pain, falls, joint pain, myalgias and neck pain.   Skin:  Negative for itching and rash.   Neurological:  Negative for dizziness, tingling, tremors, sensory change, speech change, focal weakness, seizures, loss of consciousness, weakness and headaches.   Endo/Heme/Allergies:  Negative for environmental allergies and polydipsia. Does not bruise/bleed easily.   Psychiatric/Behavioral:  Negative for depression, hallucinations, memory loss, substance abuse and suicidal ideas. The patient is not nervous/anxious and does not have insomnia.      Vitals  BP 98/63 (BP Location: Left arm, Patient Position: Sitting)   Pulse 102   Temp 98.4 °F (36.9 °C) (Oral)   Resp 16   Ht 5' 3" (1.6 m)   Wt 45.8 kg (101 lb)   SpO2 97%   BMI 17.89 kg/m²   Physical Exam  Vitals and nursing note reviewed.   Constitutional:       General: She is not in acute distress.     Appearance: Normal appearance.   HENT:      Head: Normocephalic and atraumatic.      Nose: No congestion or rhinorrhea.      Mouth/Throat:      Mouth: Mucous membranes are moist.   Eyes:      General: No scleral icterus.     Conjunctiva/sclera: Conjunctivae normal.   Cardiovascular:      Rate and Rhythm: Normal rate.      Heart sounds: No murmur heard.     No friction rub.   Pulmonary:      Effort: No respiratory distress.      Breath sounds: No wheezing, rhonchi or rales.   Abdominal:      General: Bowel sounds are normal. There is no distension.      Palpations: Abdomen is soft.      Tenderness: There is no abdominal tenderness.   Musculoskeletal:      Right lower leg: No edema.      Left lower leg: No edema.   Skin:     General: Skin is warm and dry.   Neurological:      General: No focal deficit present.      Mental Status: She is alert and oriented to person, place, and " time.   Psychiatric:         Mood and Affect: Mood normal.         Behavior: Behavior normal.         Labs:  Lab Visit on 02/05/2025   Component Date Value    WBC 02/05/2025 7.26     RBC 02/05/2025 3.75 (L)     Hemoglobin 02/05/2025 12.9     Hematocrit 02/05/2025 39.0     MCV 02/05/2025 104 (H)     MCH 02/05/2025 34.4 (H)     MCHC 02/05/2025 33.1     RDW 02/05/2025 15.2 (H)     Platelets 02/05/2025 206     MPV 02/05/2025 8.9 (L)     Immature Granulocytes 02/05/2025 0.3     Gran # (ANC) 02/05/2025 5.8     Immature Grans (Abs) 02/05/2025 0.02     Lymph # 02/05/2025 0.8 (L)     Mono # 02/05/2025 0.6     Eos # 02/05/2025 0.1     Baso # 02/05/2025 0.03     nRBC 02/05/2025 0     Gran % 02/05/2025 79.3 (H)     Lymph % 02/05/2025 11.2 (L)     Mono % 02/05/2025 8.0     Eosinophil % 02/05/2025 0.8     Basophil % 02/05/2025 0.4     Differential Method 02/05/2025 Automated     Sodium 02/05/2025 132 (L)     Potassium 02/05/2025 3.4 (L)     Chloride 02/05/2025 100     CO2 02/05/2025 23     Glucose 02/05/2025 100     BUN 02/05/2025 26 (H)     Creatinine 02/05/2025 0.7     Calcium 02/05/2025 9.4     Total Protein 02/05/2025 7.4     Albumin 02/05/2025 3.2 (L)     Total Bilirubin 02/05/2025 2.9 (H)     Alkaline Phosphatase 02/05/2025 278 (H)     AST 02/05/2025 61 (H)     ALT 02/05/2025 53 (H)     eGFR 02/05/2025 >60.0     Anion Gap 02/05/2025 9     Prothrombin Time 02/05/2025 10.8     INR 02/05/2025 1.0             No data to display                  2/5/2025    12:29 PM   6MW   6MWT Status completed without stopping   Patient Reported No complaints   Was O2 used? No   6MW Distance walked (feet) 800 feet   Distance walked (meters) 243.84 meters   Did patient stop? No   Oxygen Saturation 95 %   Supplemental Oxygen Room Air   Heart Rate 101 bpm   Blood Pressure 103/69   Ronal Dyspnea Rating  nothing at all   Oxygen Saturation 95 %   Supplemental Oxygen Room Air   Heart Rate 100 bpm   Blood Pressure 90/56   Ronal Dyspnea Rating  nothing  at all   Recovery Time (seconds) 48 seconds   Oxygen Saturation 95 %   Supplemental Oxygen Room Air   Heart Rate 99 bpm       Imaging:  Results for orders placed during the hospital encounter of 11/21/24    X-Ray Chest PA And Lateral    Narrative  EXAMINATION:  XR CHEST PA AND LATERAL    CLINICAL HISTORY:  Other cirrhosis of liver    TECHNIQUE:  PA and lateral views of the chest were performed.    COMPARISON:  No prior study    FINDINGS:  The trachea and cardiomediastinal silhouette are within normal limits.  Lungs are hyperinflated bilaterally.  Probable scarring is identified in the perihilar regions bilaterally.  Lungs are hyperinflated bilaterally.  No evidence for focal consolidation, pneumothorax or pleural effusion.  Osseous structures demonstrate no evidence for acute fractures or dislocations.    Impression  As above      Electronically signed by: Huang Brady MD  Date:    11/21/2024  Time:    12:24        Cardiodiagnostics:  No results found for this or any previous visit.      Assessment:  1. Pulmonary emphysema, unspecified emphysema type    2. Alpha-1-antichymotrypsin deficiency    3. Organ transplant candidate      Plan:     Patient with history of A1AT deficiency. ZZ phenotype and followed [facility] [place] liver transplant evaluation. While her FEV1 remains > 65%, unclear of her downtrend (if any) over the past few years. She has evidence of emphysematous changes, scattered pulmonary nodules on her CT abdomen from November. Given the extent of her emphysema on previous imaging, A1AT level of 38, and ZZ phenotype, would advocate for prolastin infusions to help preserve her lung function. Patient has stable respiratory symptoms made worse with episodes of ascites. Does not require oxygen at baseline and no exertional hypoxemia on 6MWT.   Given normal spirometry and no oxygen needs, would advocate for liver transplant. Cleared from a pulmonary standpoint.     Willa Díaz PA-C  Lung Transplant

## 2025-02-07 ENCOUNTER — DOCUMENTATION ONLY (OUTPATIENT)
Dept: TRANSPLANT | Facility: CLINIC | Age: 65
End: 2025-02-07
Payer: COMMERCIAL

## 2025-02-07 DIAGNOSIS — K74.69 DECOMPENSATED LIVER DISEASE: Primary | ICD-10-CM

## 2025-02-10 ENCOUNTER — TELEPHONE (OUTPATIENT)
Dept: TRANSPLANT | Facility: CLINIC | Age: 65
End: 2025-02-10
Payer: COMMERCIAL

## 2025-02-10 NOTE — TELEPHONE ENCOUNTER
"Ms Austin reports "horrible foot cramps" and feeling more thirsty since increasing spironolactone. Says she wasn't sure if she should increase the potassium, as well, so she doubled the dose last Thursday, but then resumed her routine daily dose. Reports starting magnesium 92 mg/d last Thursday, also, which she thought might help with the foot cramps. Patient reports increased urination, since increasing spironolactone, but says her abdomen is full and asked if ok to schedule her routine paracentesis.      Chart review shows patient had prior ED visit for hypokalemia. Patient advised the increased dose of spironolactone may help to maintain a normal potassium level. Instructed to continue routine dose of potassium (do not double the dose) and hydrate. Advised repeat labs will be to reassess her kidney function and electrolytes. Advised it's ok to schedule paracentesis, if abdomen is full. Patient expressed understanding and says labs are scheduled with [facility] on 2/17 and she will call to schedule the para.      Advised I will review c/o foot cramps and thirst with MD and notify of any recommendations. Understanding expressed.    Coordinator notified via this communication.     ----- Message from Marie sent at 2/10/2025 10:47 AM CST -----  Regarding: FW: Consult/Advisory  Contact: [phone]  Name Of Caller:         Contact Preference:   [phone]      Nature of call: Pt would like to discuss Rx spironolactone (ALDACTONE) 50 MG tablet, she states she Rx was double. She is having really bad foot cramps please call to advise thank you    ----- Message -----  From: Alyssa Panchal  Sent: 2/10/2025  10:40 AM CST  To: [facility] Pre-Liver Transplant Non-Clinical  Subject: Consult/Advisory                                 Consult/Advisory     Name Of Caller:        Contact Preference:   [phone]     Nature of call: Pt would like to discuss Rx spironolactone (ALDACTONE) 50 MG tablet, she states she Rx was double. She is " having really bad foot cramps please call to advise thank you

## 2025-02-11 ENCOUNTER — TELEPHONE (OUTPATIENT)
Dept: TRANSPLANT | Facility: CLINIC | Age: 65
End: 2025-02-11
Payer: COMMERCIAL

## 2025-02-11 NOTE — TELEPHONE ENCOUNTER
Called [facility] to request mammogram results.  Message left on  w/ above request.  Phone and fax number provided.

## 2025-02-12 ENCOUNTER — COMMITTEE REVIEW (OUTPATIENT)
Dept: TRANSPLANT | Facility: CLINIC | Age: 65
End: 2025-02-12
Payer: COMMERCIAL

## 2025-02-12 ENCOUNTER — TELEPHONE (OUTPATIENT)
Dept: TRANSPLANT | Facility: CLINIC | Age: 65
End: 2025-02-12
Payer: COMMERCIAL

## 2025-02-12 NOTE — TELEPHONE ENCOUNTER
Called patient to inform her that her case was presented in liver selection com meeting on today.  Informed patient that she was approved for transplant and will be listed once financial clearance obtained from insurance company.  Informed patient that mammogram results have not yet been received.  Will need to receive and review prior to listing.  She voiced understanding.  Instructed her to have to her potential living donor reach out to the transplant nurse coordinator for living donor liver transplant.  She agrees to do so.

## 2025-02-12 NOTE — COMMITTEE REVIEW
Ami Austin's case presented to selection committee.  Patient has been accepted for liver transplant due to Cirrhosis: Other, Specify - A1AT deficiency and complications of end stage liver disease including Hyperbilirubinemia, Hypoalbuminemia, Malnutrition, Hyponatremia, Ascites, Portal Hypertension, Splenomegaly, Esophageal Varices, Jaundice, Fatigue, Muscle wasting, Edema, Sarcopenia with a MELD score of 16.  Patient has no absolute contraindications for liver transplant.  Patient will be listed pending financial approval.     Patient will accept HBcAb positive livers.  Patient will accept HCVAB positive livers.  Patient will accept DCD livers.  Patient will accept HCV AMANDA positive livers  Patient will accept HBV AMANDA positive livers  Patient candidacy for living donor transplant: yes    Functional Status: 50% - Requires considerable assistance and frequent medical care  Receiving treatment for s/s of Spontaneous Bacterial Peritonitis: no  I was present and agree with the committee's conclusions.

## 2025-02-18 ENCOUNTER — TELEPHONE (OUTPATIENT)
Dept: TRANSPLANT | Facility: CLINIC | Age: 65
End: 2025-02-18
Payer: COMMERCIAL

## 2025-02-18 NOTE — TELEPHONE ENCOUNTER
"Patient's phone call returned.  Patient reports that she is calling to schedule her appt for April.  She states that there are some things locally that need to be done, so requesting to go ahead and schedule her f/u appt.  Patient reports that she can only come in the morning.  Informed patient that there are no open slots in the morning for the month of April.  Patient requesting to be seen in March.  Appt scheduled as requested.  Labs scheduled the evening before.  Appt reminder mailed.  During call, patient reports that has a new hernia belt and is scheduled to be seen by local provider on tomorrow to address osteoporosis.    ========================================================    ----- Message from Saeed sent at 2/18/2025 10:21 AM CST -----  Regarding: call back  Consult/Advisory:  Name Of Caller: Self Contact Preference?:[phone] What is the nature of the call?: Calling to speak w/James in regards to some question she has requesting call back   Additional Notes:"Thank you for all that you do for our patients"  "

## 2025-02-21 ENCOUNTER — RESULTS FOLLOW-UP (OUTPATIENT)
Dept: TRANSPLANT | Facility: CLINIC | Age: 65
End: 2025-02-21

## 2025-02-21 ENCOUNTER — DOCUMENTATION ONLY (OUTPATIENT)
Dept: TRANSPLANT | Facility: CLINIC | Age: 65
End: 2025-02-21
Payer: COMMERCIAL

## 2025-02-21 LAB
EXT ALBUMIN: 3.7
EXT ALKALINE PHOSPHATASE: 344
EXT ALT: 53
EXT AST: 61
EXT BILIRUBIN TOTAL: 2.4
EXT BUN: 29
EXT CALCIUM: 9.4
EXT CHLORIDE: 100
EXT CO2: 24
EXT CREATININE: 0.7 MG/DL
EXT EGFR NO RACE VARIABLE: 90
EXT GLUCOSE: 108
EXT POTASSIUM: 4.1
EXT PROTEIN TOTAL: 6.7
EXT SODIUM: 133 MMOL/L

## 2025-02-28 ENCOUNTER — TELEPHONE (OUTPATIENT)
Dept: TRANSPLANT | Facility: CLINIC | Age: 65
End: 2025-02-28
Payer: COMMERCIAL

## 2025-02-28 ENCOUNTER — DOCUMENTATION ONLY (OUTPATIENT)
Dept: TRANSPLANT | Facility: CLINIC | Age: 65
End: 2025-02-28
Payer: COMMERCIAL

## 2025-02-28 LAB
EXT INR: 1
EXT PT: 13.5

## 2025-02-28 NOTE — TELEPHONE ENCOUNTER
Called pt to inform her that she has been added to the liver transplant waiting list w/ MELD score 14.  Patient voiced understanding that she is now actively listed for transplant.  Discussed the need to remain available at all times by phone for offers.  Instructed patient of the need to call transplant for any acute issues, including local hospital admissions, travel plans, as well as any changes in insurance coverage. Discussed requirements for waitlist maintenance.  All questions/ concerns regarding listing status answered and addressed.  Understanding verbalized.  Patient reports that she has several potential living donors and agrees to provide them w/ the contact info to living donor liver transplant coordinator to initiate the process.

## 2025-02-28 NOTE — TELEPHONE ENCOUNTER
"  LIVER WAIT LISTING NOTE    **NOTE:   IF ANY EXTERNAL LABS ARE USED FOR LISTING THE VALUES AND DATES MUST BE ENTERED IN EPIC TO GENERATE THIS NOTE**    Date of Financial clearance to list: 2025    [facility]:       Organ: Liver    Last Name: Geovanna  First Name: Ami     : 1960      Birth sex:     female         MRN#: [id number]                                   State of Permanent Residence:  [address] [place] [address]    Ethnicity: Not  or /a   Race:      White    CLINICAL INFORMATION       ABO  ABO Blood Group:   O POS     ABO Confirmation: (THESE DATES MUST BE PRIOR TO THE LIST DATE AND SUPPORTED BY SEPARATE LAB REPORTS)    Internal Results    Lab Results   Component Value Date    GROUPTRH O POS 2024    GROUPTRH O POS 2024     No results found for: "ABO"    External Results    ABO Date 1:  ABO Result 1:  ABO Date 2:  ABO Result 2:     Are either of these ABO results based on External Labs? No  (If Yes, STOP and go to source document in Media Tab for verification).    VITALS  Height:    Ht Readings from Last 1 Encounters:   25 5' 3" (1.6 m)     Weight:    Wt Readings from Last 1 Encounters:   25 46.3 kg (102 lb 1.2 oz)       LIVER ORGAN INFORMATION  Candidate Medical Urgency Status: MELD/PELD    Number of Previous Transplants: 0  Sex for the purposes of adult MELD calculation: Female    MELD/PELD Data Collection:  Had dialysis twice, or 24 hours of CVVHD, within 1 week prior to the serum creatinine test: No  Encephalopathy: none Date: 2025  Ascites: moderate Date: 2025    MELD Score:  14    Lab Results   Component Value Date    EXTINR 1.0 2025    EXTCREATININ 0.7 2025          EXTSODIUM 133 2025    EXTBILITOTAL 2.4 2025    EXTALBUMIN 3.7 2025       Will Recipient Accept?   Accept HBcAB Positive Organ:  Yes  Accept HBV AMANDA Positive Organ:  Yes  Accept HCV Antibody Positive Organ: Yes   Accept HCV AMANDA Positive " Organ:  Yes                        Local: No                           Import: No  Accept DCD Organ:    Yes  Minimum acceptable donor age:  5 years  Maximum acceptable donor age:  99 years  Minimum acceptable donor weight:  40 lbs    Maximum acceptable donor weight:  440 lb  Maximum miles the organ or  Recovery team will travel:   5000 miles    Blood Type x2 was verified by myself and Riri Murphy RN.  Blood type determination and reporting was completed according to the programs protocols and OPTN requirements.

## 2025-02-28 NOTE — TELEPHONE ENCOUNTER
"Patient's phone call returned.  Informed her that financial clearance has been obtained from the insurance company to proceed w/ liver transplant listing.  In addition, outside labs received.  Only need SS#, which patient provided during phone call.  Informed patient that she will be listed today.  She voiced understanding.  During call, patient reports that she is taking a protein supplement routinely (and enjoys it :-)), has been seen by local provider for osteoporosis, and is doing well w/ increased diuretics (requiring less frequent daniela).  Will pass on to patient's Hepatologist.  Will request clinic encounter note regarding osteoporosis.    ===============================================================================================    ----- Message from Marie sent at 2/28/2025 10:05 AM CST -----    ----- Message -----  From: Rome Marrufo  Sent: 2/28/2025   9:35 AM CST  To: [facility] Pre-Liver Transplant Non-Clinical    Consult/AdvisoryName Of Caller: SelfContact Preference?: [phone] Provider Name: Dimitry is the nature of the call?: Requesting clarification if her insurance is cleared for transplant procedureAdditional Notes:"Thank you for all that you do for our patients"  "

## 2025-03-11 ENCOUNTER — LAB VISIT (OUTPATIENT)
Dept: LAB | Facility: HOSPITAL | Age: 65
End: 2025-03-11
Payer: COMMERCIAL

## 2025-03-11 DIAGNOSIS — E88.01 EMPHYSEMA DUE TO ALPHA-1-ANTITRYPSIN DEFICIENCY: ICD-10-CM

## 2025-03-11 DIAGNOSIS — J43.8 EMPHYSEMA DUE TO ALPHA-1-ANTITRYPSIN DEFICIENCY: ICD-10-CM

## 2025-03-11 DIAGNOSIS — Z83.49 FAMILY HISTORY OF ALPHA 1 ANTITRYPSIN DEFICIENCY: ICD-10-CM

## 2025-03-11 DIAGNOSIS — I85.11 SECONDARY ESOPHAGEAL VARICES WITH BLEEDING: ICD-10-CM

## 2025-03-11 DIAGNOSIS — R18.8 ASCITES OF LIVER: ICD-10-CM

## 2025-03-11 DIAGNOSIS — M62.84 SARCOPENIA: ICD-10-CM

## 2025-03-11 DIAGNOSIS — Z86.39 HISTORY OF ALPHA-1-ANTITRYPSIN DEFICIENCY: ICD-10-CM

## 2025-03-11 DIAGNOSIS — K76.6 PORTAL HYPERTENSION: ICD-10-CM

## 2025-03-11 DIAGNOSIS — Z01.818 ENCOUNTER FOR PRE-TRANSPLANT EVALUATION FOR LIVER TRANSPLANT: ICD-10-CM

## 2025-03-11 DIAGNOSIS — K42.9 UMBILICAL HERNIA WITHOUT OBSTRUCTION AND WITHOUT GANGRENE: ICD-10-CM

## 2025-03-11 DIAGNOSIS — K74.69 DECOMPENSATED LIVER DISEASE: ICD-10-CM

## 2025-03-11 LAB
ALBUMIN SERPL BCP-MCNC: 3.2 G/DL (ref 3.5–5.2)
ALP SERPL-CCNC: 309 U/L (ref 40–150)
ALT SERPL W/O P-5'-P-CCNC: 47 U/L (ref 10–44)
ANION GAP SERPL CALC-SCNC: 8 MMOL/L (ref 8–16)
AST SERPL-CCNC: 58 U/L (ref 10–40)
BASOPHILS # BLD AUTO: 0.04 K/UL (ref 0–0.2)
BASOPHILS NFR BLD: 0.5 % (ref 0–1.9)
BILIRUB SERPL-MCNC: 2 MG/DL (ref 0.1–1)
BUN SERPL-MCNC: 27 MG/DL (ref 8–23)
CALCIUM SERPL-MCNC: 9.4 MG/DL (ref 8.7–10.5)
CHLORIDE SERPL-SCNC: 100 MMOL/L (ref 95–110)
CO2 SERPL-SCNC: 25 MMOL/L (ref 23–29)
CREAT SERPL-MCNC: 0.8 MG/DL (ref 0.5–1.4)
DIFFERENTIAL METHOD BLD: ABNORMAL
EOSINOPHIL # BLD AUTO: 0 K/UL (ref 0–0.5)
EOSINOPHIL NFR BLD: 0.5 % (ref 0–8)
ERYTHROCYTE [DISTWIDTH] IN BLOOD BY AUTOMATED COUNT: 15.1 % (ref 11.5–14.5)
EST. GFR  (NO RACE VARIABLE): >60 ML/MIN/1.73 M^2
GLUCOSE SERPL-MCNC: 84 MG/DL (ref 70–110)
HCT VFR BLD AUTO: 37 % (ref 37–48.5)
HGB BLD-MCNC: 12.3 G/DL (ref 12–16)
IMM GRANULOCYTES # BLD AUTO: 0.02 K/UL (ref 0–0.04)
IMM GRANULOCYTES NFR BLD AUTO: 0.3 % (ref 0–0.5)
INR PPP: 1 (ref 0.8–1.2)
LYMPHOCYTES # BLD AUTO: 0.9 K/UL (ref 1–4.8)
LYMPHOCYTES NFR BLD: 12.9 % (ref 18–48)
MCH RBC QN AUTO: 34.1 PG (ref 27–31)
MCHC RBC AUTO-ENTMCNC: 33.2 G/DL (ref 32–36)
MCV RBC AUTO: 103 FL (ref 82–98)
MONOCYTES # BLD AUTO: 0.6 K/UL (ref 0.3–1)
MONOCYTES NFR BLD: 8.6 % (ref 4–15)
NEUTROPHILS # BLD AUTO: 5.6 K/UL (ref 1.8–7.7)
NEUTROPHILS NFR BLD: 77.2 % (ref 38–73)
NRBC BLD-RTO: 0 /100 WBC
PLATELET # BLD AUTO: 230 K/UL (ref 150–450)
PMV BLD AUTO: 8.9 FL (ref 9.2–12.9)
POTASSIUM SERPL-SCNC: 3.5 MMOL/L (ref 3.5–5.1)
PROT SERPL-MCNC: 6.9 G/DL (ref 6–8.4)
PROTHROMBIN TIME: 11.3 SEC (ref 9–12.5)
RBC # BLD AUTO: 3.61 M/UL (ref 4–5.4)
SODIUM SERPL-SCNC: 133 MMOL/L (ref 136–145)
WBC # BLD AUTO: 7.31 K/UL (ref 3.9–12.7)

## 2025-03-11 PROCEDURE — 82104 ALPHA-1-ANTITRYPSIN PHENO: CPT | Mod: TXP | Performed by: INTERNAL MEDICINE

## 2025-03-11 PROCEDURE — 85610 PROTHROMBIN TIME: CPT | Mod: TXP | Performed by: INTERNAL MEDICINE

## 2025-03-11 PROCEDURE — 80053 COMPREHEN METABOLIC PANEL: CPT | Mod: TXP | Performed by: INTERNAL MEDICINE

## 2025-03-11 PROCEDURE — 85025 COMPLETE CBC W/AUTO DIFF WBC: CPT | Mod: TXP | Performed by: INTERNAL MEDICINE

## 2025-03-12 ENCOUNTER — RESULTS FOLLOW-UP (OUTPATIENT)
Dept: TRANSPLANT | Facility: CLINIC | Age: 65
End: 2025-03-12

## 2025-03-12 ENCOUNTER — OFFICE VISIT (OUTPATIENT)
Dept: TRANSPLANT | Facility: CLINIC | Age: 65
End: 2025-03-12
Payer: COMMERCIAL

## 2025-03-12 VITALS
SYSTOLIC BLOOD PRESSURE: 107 MMHG | TEMPERATURE: 98 F | BODY MASS INDEX: 17.81 KG/M2 | DIASTOLIC BLOOD PRESSURE: 65 MMHG | RESPIRATION RATE: 17 BRPM | HEART RATE: 84 BPM | WEIGHT: 100.5 LBS | HEIGHT: 63 IN | OXYGEN SATURATION: 99 %

## 2025-03-12 DIAGNOSIS — K42.9 UMBILICAL HERNIA WITHOUT OBSTRUCTION AND WITHOUT GANGRENE: ICD-10-CM

## 2025-03-12 DIAGNOSIS — Z83.49 FAMILY HISTORY OF ALPHA 1 ANTITRYPSIN DEFICIENCY: ICD-10-CM

## 2025-03-12 DIAGNOSIS — E88.09 ALPHA-1-ANTICHYMOTRYPSIN DEFICIENCY: ICD-10-CM

## 2025-03-12 DIAGNOSIS — M62.84 SARCOPENIA: ICD-10-CM

## 2025-03-12 DIAGNOSIS — Z76.82 ORGAN TRANSPLANT CANDIDATE: ICD-10-CM

## 2025-03-12 DIAGNOSIS — I85.11 SECONDARY ESOPHAGEAL VARICES WITH BLEEDING: ICD-10-CM

## 2025-03-12 DIAGNOSIS — J43.8 EMPHYSEMA DUE TO ALPHA-1-ANTITRYPSIN DEFICIENCY: ICD-10-CM

## 2025-03-12 DIAGNOSIS — E88.01 EMPHYSEMA DUE TO ALPHA-1-ANTITRYPSIN DEFICIENCY: ICD-10-CM

## 2025-03-12 DIAGNOSIS — R18.8 ASCITES OF LIVER: ICD-10-CM

## 2025-03-12 DIAGNOSIS — K74.69 DECOMPENSATED LIVER DISEASE: ICD-10-CM

## 2025-03-12 DIAGNOSIS — K76.6 PORTAL HYPERTENSION: ICD-10-CM

## 2025-03-12 PROCEDURE — 99999 PR PBB SHADOW E&M-EST. PATIENT-LVL IV: CPT | Mod: PBBFAC,TXP,, | Performed by: INTERNAL MEDICINE

## 2025-03-12 NOTE — Clinical Note
Please increase Lasix to 60mg qd, Aldactone 150mg qd. Repeat labs in 1 week after increasing doses.  No other recs at this time.  RTC in 2 months. Thank you.

## 2025-03-12 NOTE — LETTER
March 12, 2025        Sj Novoa  [address] [facility] [address]  Phone: [phone]  Fax: [phone]             [facility]  [address]  [facility] [address]  Phone: [phone]   Patient: Ami Austin   MR Number: [id number]   YOB: 1960   Date of Visit: 3/12/2025       Dear Dr. Sj Novoa    Thank you for referring Ami Austin to me for evaluation. Attached you will find relevant portions of my assessment and plan of care.    If you have questions, please do not hesitate to call me. I look forward to following Ami Austin along with you.    Sincerely,    Madelin Napier MD    Enclosure    If you would like to receive this communication electronically, please contact [email or web address] or [phone] to request REGIST[employer]AT[employer] Link access.    REGIST[employer]AT[employer] Link is a tool which provides read-only access to select patient information with whom you have a relationship. Its easy to use and provides real time access to review your patients record including encounter summaries, notes, results, and demographic information.    If you feel you have received this communication in error or would no longer like to receive these types of communications, please e-mail [email or web address]

## 2025-03-12 NOTE — PROGRESS NOTES
Transplant Hepatology   Transplant Evaluation Follow Up Note    Referring provider: No ref. provider found  PCP: No, Primary Doctor    Chief complaint: follow up of A1AT deficiency related cirrhosis    HPI: Ami Austin is a 64 y.o. female who presents to Transplant [facility]atology Clinic for follow up of A1AT deficiency related cirrhosis. She is accompanied by her , Eric.    Last seen in Transplant Hepatology Clinic on 2/5/25.     Interval Events:   - Feels well today.   - Notes ascites has not been accumulating as quickly since increasing doses of dieretics.  - Taking Lasix 40mg qd, Aldactone 100mg qd. Adhering to low salt diet.  - Adhering to high protein diet.   - Denies recent hospitalizations, ED visits.   - She denies signs of encephalopathy, jaundice, GI bleeding.      Past Medical History:   Diagnosis Date    Alpha-1-antitrypsin deficiency     Ascites     Emphysema, unspecified     Esophageal varices     Portal hypertension     Splenomegaly     Unspecified cirrhosis of liver        Past Surgical History:   Procedure Laterality Date    ESOPHAGOGASTRODUODENOSCOPY  06/18/2024    Grade III esophageal varices.    MECKEL DIVERTICULUM EXCISION      OVARIAN CYST REMOVAL         No family history on file.    Social History     Tobacco Use    Smoking status: Never     Passive exposure: Past    Smokeless tobacco: Never   Substance Use Topics    Alcohol use: Not Currently    Drug use: Never       Current Outpatient Medications   Medication Sig Dispense Refill    albuterol (PROVENTIL/VENTOLIN HFA) 90 mcg/actuation inhaler 2 puffs 4 (four) times daily as needed.      famotidine (PEPCID) 40 MG tablet Take 40 mg by mouth once daily.      furosemide (LASIX) 20 MG tablet Take 20 mg by mouth 2 (two) times daily.      pantoprazole (PROTONIX) 40 MG tablet Take 40 mg by mouth every evening.      potassium chloride SA (K-DUR,KLOR-CON) 20 MEQ tablet Take 20 mEq by mouth once daily.      spironolactone (ALDACTONE) 50 MG tablet  "Take 100 mg by mouth once daily.      SYMBICORT 160-4.5 mcg/actuation HFAA Inhale 2 puffs into the lungs every 12 (twelve) hours.       No current facility-administered medications for this visit.       Review of patient's allergies indicates:   Allergen Reactions    Ephedrine Other (See Comments)     Hyperexcitability    House dust mite     Egg derived Hives and Nausea And Vomiting            Vitals:    03/12/25 0800   BP: 107/65   Pulse: 84   Resp: 17   Temp: 97.7 °F (36.5 °C)   TempSrc: Temporal   SpO2: 99%   Weight: 45.6 kg (100 lb 8.5 oz)   Height: 5' 2.52" (1.588 m)   Body mass index is 18.08 kg/m².    Physical Exam  Constitutional:       General: She is not in acute distress.     Appearance: She is underweight. She is not toxic-appearing.   Eyes:      General: No scleral icterus.  Cardiovascular:      Rate and Rhythm: Normal rate.   Pulmonary:      Effort: Pulmonary effort is normal.   Abdominal:      General: Abdomen is protuberant. There is no distension.      Palpations: There is fluid wave.      Tenderness: There is no abdominal tenderness.      Hernia: A hernia (Easily reducible, nontender to palpation.) is present. Hernia is present in the umbilical area.   Musculoskeletal:         General: No swelling.   Skin:     General: Skin is warm.      Coloration: Skin is not jaundiced.   Neurological:      General: No focal deficit present.      Mental Status: She is alert and oriented to person, place, and time.      Comments: No asterixis.   Psychiatric:         Thought Content: Thought content normal.         LABS: I personally reviewed pertinent laboratory findings.    Lab Results   Component Value Date    ALT 47 (H) 03/11/2025    AST 58 (H) 03/11/2025     (H) 11/21/2024    ALKPHOS 309 (H) 03/11/2025    BILITOT 2.0 (H) 03/11/2025       Lab Results   Component Value Date    WBC 7.31 03/11/2025    HGB 12.3 03/11/2025    HCT 37.0 03/11/2025     (H) 03/11/2025     03/11/2025       Lab " "Results   Component Value Date     (L) 2025    K 3.5 2025     2025    CO2 25 2025    BUN 27 (H) 2025    CREATININE 0.8 2025    CALCIUM 9.4 2025    ANIONGAP 8 2025       Lab Results   Component Value Date    INR 1.0 2025    INR 1.0 2025    INR 1.0 2024       No results found for: "SMOOTHMUSCAB", "MITOAB"    Lab Results   Component Value Date    IRON 140 2024    TIBC 377 2024    FERRITIN 121 2024       Lab Results   Component Value Date    HEPBCAB Non-reactive 2024    HEPCAB Non-reactive 2024         No results found for: "TREVOR"    Imaging:   I personally reviewed recent imaging studies available on the chart and from outside medical records.      Assessment:  64 y.o. female with A1AT deficiency related cirrhosis. She is decompensated with ascites, history of variceal bleeding.    1. Decompensated liver disease    2. Alpha-1-antichymotrypsin deficiency    3. Ascites of liver    4. Portal hypertension    5. Secondary esophageal varices with bleeding    6. Sarcopenia    7. Umbilical hernia without obstruction and without gangrene    8. Organ transplant candidate    9. Family history of alpha 1 antitrypsin deficiency    10. Emphysema due to alpha-1-antitrypsin deficiency        MELD 3.0: 14 at 3/11/2025  4:13 PM  MELD-Na: 9 at 3/11/2025  4:13 PM  Calculated from:  Serum Creatinine: 0.8 mg/dL (Using min of 1 mg/dL) at 3/11/2025  4:13 PM  Serum Sodium: 133 mmol/L at 3/11/2025  4:13 PM  Total Bilirubin: 2 mg/dL at 3/11/2025  4:13 PM  Serum Albumin: 3.2 g/dL at 3/11/2025  4:13 PM  INR(ratio): 1 at 3/11/2025  4:13 PM  Age at listin years  Sex: Female at 3/11/2025  4:13 PM      UNOS Patient Status  Functional Status: 50% - Requires considerable assistance and frequent medical care  Physical Capacity: Limited Mobility    Transplant Candidacy: Patient is a 64 y.o. female with end-stage liver disease secondary to A1AT " deficiency related cirrhosis. Actively listed for liver transplant. Awaiting organ offers.    Recommendations:  Ascites/Edema: Improved, but still has large volume ascites. Increase Lasix to 60mg qd, increase to Aldactone 150mg qd. Therapeutic paracentesis as needed, limit to 5L at a time. Briefly discussed the possibility of TIPS, but will attempt to optimize volume status with diuretics. Na 2 g diet.    Encephalopathy: Not an active issue.    Variceal screening: Outside EGD in June 2024 large EV treated with esophageal banding.     HCC screening: CT TP w/o HCC 11/22/24. AFP 3.6 11/21/24. Repeat abdominal US and AFP every 6 months.     Immunizations: Not immune to HAV or HBV. Recommend vaccination for both    CRC screening: Colonoscopy in Nov 2024 with 10 subcent polyps, 9 adenomas. Recommend repeat colonoscopy in 1 year.    Sarcopenia: BMI 18. Improving. Counseled regarding high protein diet, protein shakes, bedtime snack.     Emphysema: A1ATD. Seen by [facility] 2/5/25. Per Pulm team, given her ZZ phenotype, low alpha 1 level, and some emphysema, could consider prolastin infusions.     Umbilical Hernia: Small. Easily reducible. Recommend wearing abdominal binder to help prevent incarceration.     Living Donation: Reports several relatives have come forward as potential living donors.     Return to clinic in 2 months.    I have sent communication to the referring physician and/or primary care provider.    Madelin Napier MD  Staff Physician  Hepatology and Liver Transplant  [facility]  [facility]

## 2025-03-14 ENCOUNTER — TELEPHONE (OUTPATIENT)
Dept: TRANSPLANT | Facility: CLINIC | Age: 65
End: 2025-03-14
Payer: COMMERCIAL

## 2025-03-14 DIAGNOSIS — R18.8 ASCITES OF LIVER: Primary | ICD-10-CM

## 2025-03-14 RX ORDER — FUROSEMIDE 20 MG/1
20 TABLET ORAL SEE ADMIN INSTRUCTIONS
Qty: 120 TABLET | Refills: 6 | Status: SHIPPED | OUTPATIENT
Start: 2025-03-14

## 2025-03-14 NOTE — TELEPHONE ENCOUNTER
PATIENT NAME: Ami Austin  [facility] #: [id number]    Lab Results   Component Value Date    CREATININE 0.8 03/11/2025     (L) 03/11/2025    BILITOT 2.0 (H) 03/11/2025    ALBUMIN 3.2 (L) 03/11/2025    INR 1.0 03/11/2025       Encephalopathy: none  Ascites: moderate  Dialysis: no    MELD 14     Recertification requestor: James Ferrell

## 2025-03-14 NOTE — TELEPHONE ENCOUNTER
Patient informed of recommendations.  Patient voiced understanding and is able to accurately repeat back dosage changes.  Lab orders faxed to [facility].    =========================================================================================    ----- Message from Madelin Napier MD sent at 3/12/2025 11:22 AM CDT -----  Please increase Lasix to 60mg qd, Aldactone 150mg qd. Repeat labs in 1 week after increasing doses.

## 2025-03-18 LAB
A1AT PHENOTYP SERPL-IMP: ABNORMAL BANDS
A1AT SERPL NEPH-MCNC: 26 MG/DL (ref 100–190)

## 2025-03-20 ENCOUNTER — TELEPHONE (OUTPATIENT)
Dept: TRANSPLANT | Facility: CLINIC | Age: 65
End: 2025-03-20

## 2025-03-20 ENCOUNTER — TELEPHONE (OUTPATIENT)
Dept: TRANSPLANT | Facility: CLINIC | Age: 65
End: 2025-03-20
Payer: COMMERCIAL

## 2025-03-20 NOTE — TELEPHONE ENCOUNTER
----- Message from [employer] sent at 3/20/2025  4:23 PM CDT -----  Called and sp to pt; labs, u/s and appt with Dr. Karthikeyan mcgraw'ed for 5/13 and 5/14.

## 2025-03-20 NOTE — TELEPHONE ENCOUNTER
"Patient's phone call returned.    ----- Message from Saeed sent at 3/20/2025 11:14 AM CDT -----  Regarding: call back  Consult/Advisory:  Name Of Caller: Self Contact Preference?:[phone] What is the nature of the call?: Calling to speak w/ James in regards to her labs also needs clarification on her medication  also she has some question requesting call back  Additional Notes:"Thank you for all that you do for our patients"  "

## 2025-04-07 ENCOUNTER — TELEPHONE (OUTPATIENT)
Dept: TRANSPLANT | Facility: CLINIC | Age: 65
End: 2025-04-07
Payer: COMMERCIAL

## 2025-04-07 NOTE — TELEPHONE ENCOUNTER
"Patient's phone call returned.  Confirmed that message received regarding shingles shot.  Patient also reports that she has seasonal allergies and asking if ok to take Claritin.  Informed patient ok to take but to contact PCP if symptoms don't improve.  Patient denies fever.  She does however report a cough that was so "deep" that it would cause back pain and productive at times w/ clear sputum noted. .  Cough  seems to be getting better now.  Patient also reports weigh loss.  She states that she is now gaining some of the weight back.  Current weight is 96 lbs.  Ascites now under better control since increase in diuretics.  Will request most recent lab results.  Not requiring daniela as frequently.  Although fluid retention has improved, umbilical hernia seems to be larger and has changed in shape (seems wider).  She denies any associated pain or discoloration.  Above sent to Hepatologist for review.    =======================================================================      ----- Message from Liquid X sent at 4/7/2025 10:05 AM CDT -----  Regarding: Consult/Advisory  Contact: Ami Austin   Consult/Advisory Name Of Caller:Ami Austin   Contact Preference:[phone] (home)   Nature of call:Patient is calling to speak to Yasmeena and let her know that she had her 2nd shingles shot . Also her allergy are really bad and wants to know if she take Clartin . Requesting a call back  "

## 2025-04-22 ENCOUNTER — TELEPHONE (OUTPATIENT)
Dept: TRANSPLANT | Facility: CLINIC | Age: 65
End: 2025-04-22
Payer: COMMERCIAL

## 2025-04-22 NOTE — TELEPHONE ENCOUNTER
Patient's phone call returned .  Informed her that u/s is scheduled 5/14 at [facility].  Recommendation made to keep u/s as scheduled.  Will forward results to Dr. Dickson's office once available.  Patient voiced understanding and in agreement w/ the plan.  During call patient reports that she started monthly injections for osteoporosis (as recommended by local Endocrinologist).    ============================================================================================      ----- Message -----  From: Alyssa Panchal  Sent: 4/21/2025   9:43 AM CDT  To: [facility] Pre-Liver Transplant Non-Clinical  Subject: Consult/Advisory                                 Consult/Advisory Name Of Caller: pt   Contact Preference:  [phone]  Nature of call: would like to know if US should be done with Dr. Dickson or here with Dr. Napier. Please call to advise thank you   No

## 2025-05-04 ENCOUNTER — PATIENT MESSAGE (OUTPATIENT)
Dept: TRANSPLANT | Facility: CLINIC | Age: 65
End: 2025-05-04
Payer: COMMERCIAL

## 2025-05-05 DIAGNOSIS — R18.8 ASCITES OF LIVER: ICD-10-CM

## 2025-05-05 RX ORDER — FUROSEMIDE 20 MG/1
20 TABLET ORAL SEE ADMIN INSTRUCTIONS
Qty: 90 TABLET | Refills: 6 | Status: SHIPPED | OUTPATIENT
Start: 2025-05-05

## 2025-05-13 ENCOUNTER — LAB VISIT (OUTPATIENT)
Dept: LAB | Facility: HOSPITAL | Age: 65
End: 2025-05-13
Attending: INTERNAL MEDICINE
Payer: COMMERCIAL

## 2025-05-13 ENCOUNTER — TELEPHONE (OUTPATIENT)
Dept: TRANSPLANT | Facility: CLINIC | Age: 65
End: 2025-05-13
Payer: COMMERCIAL

## 2025-05-13 ENCOUNTER — RESULTS FOLLOW-UP (OUTPATIENT)
Dept: HEPATOLOGY | Facility: CLINIC | Age: 65
End: 2025-05-13

## 2025-05-13 DIAGNOSIS — R18.8 ASCITES OF LIVER: ICD-10-CM

## 2025-05-13 DIAGNOSIS — K72.90 DECOMPENSATED LIVER DISEASE: ICD-10-CM

## 2025-05-13 DIAGNOSIS — I85.11 SECONDARY ESOPHAGEAL VARICES WITH BLEEDING: ICD-10-CM

## 2025-05-13 DIAGNOSIS — K76.6 PORTAL HYPERTENSION: ICD-10-CM

## 2025-05-13 DIAGNOSIS — M62.84 SARCOPENIA: ICD-10-CM

## 2025-05-13 DIAGNOSIS — E88.01 EMPHYSEMA DUE TO ALPHA-1-ANTITRYPSIN DEFICIENCY: ICD-10-CM

## 2025-05-13 DIAGNOSIS — Z83.49 FAMILY HISTORY OF ALPHA 1 ANTITRYPSIN DEFICIENCY: ICD-10-CM

## 2025-05-13 DIAGNOSIS — Z76.82 ORGAN TRANSPLANT CANDIDATE: ICD-10-CM

## 2025-05-13 DIAGNOSIS — K74.60 DECOMPENSATED LIVER DISEASE: ICD-10-CM

## 2025-05-13 DIAGNOSIS — J43.8 EMPHYSEMA DUE TO ALPHA-1-ANTITRYPSIN DEFICIENCY: ICD-10-CM

## 2025-05-13 DIAGNOSIS — E88.09 ALPHA-1-ANTICHYMOTRYPSIN DEFICIENCY: ICD-10-CM

## 2025-05-13 DIAGNOSIS — K42.9 UMBILICAL HERNIA WITHOUT OBSTRUCTION AND WITHOUT GANGRENE: ICD-10-CM

## 2025-05-13 LAB
ABSOLUTE EOSINOPHIL (OHS): 0.02 K/UL
ABSOLUTE MONOCYTE (OHS): 0.94 K/UL (ref 0.3–1)
ABSOLUTE NEUTROPHIL COUNT (OHS): 7.55 K/UL (ref 1.8–7.7)
AFP SERPL-MCNC: 4.3 NG/ML
ALBUMIN SERPL BCP-MCNC: 3.4 G/DL (ref 3.5–5.2)
ALP SERPL-CCNC: 331 UNIT/L (ref 40–150)
ALT SERPL W/O P-5'-P-CCNC: 46 UNIT/L (ref 10–44)
ANION GAP (OHS): 8 MMOL/L (ref 8–16)
AST SERPL-CCNC: 47 UNIT/L (ref 11–45)
BASOPHILS # BLD AUTO: 0.04 K/UL
BASOPHILS NFR BLD AUTO: 0.4 %
BILIRUB SERPL-MCNC: 3.3 MG/DL (ref 0.1–1)
BUN SERPL-MCNC: 28 MG/DL (ref 8–23)
CALCIUM SERPL-MCNC: 9.7 MG/DL (ref 8.7–10.5)
CHLORIDE SERPL-SCNC: 103 MMOL/L (ref 95–110)
CO2 SERPL-SCNC: 23 MMOL/L (ref 23–29)
CREAT SERPL-MCNC: 0.7 MG/DL (ref 0.5–1.4)
ERYTHROCYTE [DISTWIDTH] IN BLOOD BY AUTOMATED COUNT: 16 % (ref 11.5–14.5)
GFR SERPLBLD CREATININE-BSD FMLA CKD-EPI: >60 ML/MIN/1.73/M2
GLUCOSE SERPL-MCNC: 96 MG/DL (ref 70–110)
HCT VFR BLD AUTO: 39.1 % (ref 37–48.5)
HGB BLD-MCNC: 13.1 GM/DL (ref 12–16)
IMM GRANULOCYTES # BLD AUTO: 0.07 K/UL (ref 0–0.04)
IMM GRANULOCYTES NFR BLD AUTO: 0.7 % (ref 0–0.5)
INR PPP: 1 (ref 0.8–1.2)
LYMPHOCYTES # BLD AUTO: 0.91 K/UL (ref 1–4.8)
MCH RBC QN AUTO: 34 PG (ref 27–31)
MCHC RBC AUTO-ENTMCNC: 33.5 G/DL (ref 32–36)
MCV RBC AUTO: 102 FL (ref 82–98)
NUCLEATED RBC (/100WBC) (OHS): 0 /100 WBC
PLATELET # BLD AUTO: 243 K/UL (ref 150–450)
PMV BLD AUTO: 9.3 FL (ref 9.2–12.9)
POTASSIUM SERPL-SCNC: 4 MMOL/L (ref 3.5–5.1)
PROT SERPL-MCNC: 7.7 GM/DL (ref 6–8.4)
PROTHROMBIN TIME: 10.9 SECONDS (ref 9–12.5)
RBC # BLD AUTO: 3.85 M/UL (ref 4–5.4)
RELATIVE EOSINOPHIL (OHS): 0.2 %
RELATIVE LYMPHOCYTE (OHS): 9.5 % (ref 18–48)
RELATIVE MONOCYTE (OHS): 9.9 % (ref 4–15)
RELATIVE NEUTROPHIL (OHS): 79.3 % (ref 38–73)
SODIUM SERPL-SCNC: 134 MMOL/L (ref 136–145)
WBC # BLD AUTO: 9.53 K/UL (ref 3.9–12.7)

## 2025-05-13 PROCEDURE — 36415 COLL VENOUS BLD VENIPUNCTURE: CPT | Mod: TXP

## 2025-05-13 PROCEDURE — 80053 COMPREHEN METABOLIC PANEL: CPT | Mod: TXP

## 2025-05-13 PROCEDURE — 82105 ALPHA-FETOPROTEIN SERUM: CPT | Mod: TXP

## 2025-05-13 PROCEDURE — 85610 PROTHROMBIN TIME: CPT | Mod: TXP

## 2025-05-13 PROCEDURE — 80321 ALCOHOLS BIOMARKERS 1OR 2: CPT | Mod: TXP

## 2025-05-13 PROCEDURE — 85025 COMPLETE CBC W/AUTO DIFF WBC: CPT | Mod: TXP

## 2025-05-13 NOTE — TELEPHONE ENCOUNTER
"[facility] LIVER COORDINATOR BACK-UP ORGAN OFFER NOTE   UNOS# [id number]  Notified by Wil Peterson, , that Ami Austin is eligible for liver as a backup recipient offer.  Spoke with patient and identified no acute medical issues with telephone assessment. Protocol script read to patient regarding PHS risk criteria donor offer due to unknown medical history.  Patient was informed that if she turned down the offer A transplant doctor will call you after we hang up."  Patient was also informed that if she turned down this donor, she would not be punished or removed from the transplant list, that she would remain on the list at her current status/MELD score. However, by waiting on the list longer rather than receiving this transplant, she will face a greater risk of death than any risk from the slight possibility of transmitted infection.  Patient was asked if they have any signs or symptoms of Covid. If patient described signs or symptoms of Covid, inform patient that they will be tested for COVID-19 upon arrival to the hospital. If tested and result is positive, the transplant will not be able to occur, they will be inactivated on the wait list for 21 days per protocol.  Patient verbalized understanding, all questions answered, patient accepts organ offer. Ms. Austin is already currently here at the [facility] for her early morning appts tomorrow with Dr. Napier and Abdominal US.   Allowed time for all questions and answers.     "

## 2025-05-14 ENCOUNTER — PATIENT MESSAGE (OUTPATIENT)
Dept: TRANSPLANT | Facility: CLINIC | Age: 65
End: 2025-05-14

## 2025-05-14 ENCOUNTER — OFFICE VISIT (OUTPATIENT)
Dept: TRANSPLANT | Facility: CLINIC | Age: 65
End: 2025-05-14
Payer: COMMERCIAL

## 2025-05-14 ENCOUNTER — HOSPITAL ENCOUNTER (OUTPATIENT)
Dept: RADIOLOGY | Facility: HOSPITAL | Age: 65
Discharge: HOME | End: 2025-05-14
Attending: INTERNAL MEDICINE
Payer: COMMERCIAL

## 2025-05-14 ENCOUNTER — RESULTS FOLLOW-UP (OUTPATIENT)
Dept: HEPATOLOGY | Facility: CLINIC | Age: 65
End: 2025-05-14

## 2025-05-14 VITALS
TEMPERATURE: 98 F | HEART RATE: 98 BPM | HEIGHT: 63 IN | RESPIRATION RATE: 18 BRPM | BODY MASS INDEX: 16.83 KG/M2 | OXYGEN SATURATION: 96 % | DIASTOLIC BLOOD PRESSURE: 76 MMHG | SYSTOLIC BLOOD PRESSURE: 117 MMHG | WEIGHT: 95 LBS

## 2025-05-14 DIAGNOSIS — Z76.82 ORGAN TRANSPLANT CANDIDATE: ICD-10-CM

## 2025-05-14 DIAGNOSIS — M62.84 SARCOPENIA: ICD-10-CM

## 2025-05-14 DIAGNOSIS — K74.60 DECOMPENSATED LIVER DISEASE: ICD-10-CM

## 2025-05-14 DIAGNOSIS — K72.90 DECOMPENSATED LIVER DISEASE: ICD-10-CM

## 2025-05-14 DIAGNOSIS — E88.01 EMPHYSEMA DUE TO ALPHA-1-ANTITRYPSIN DEFICIENCY: ICD-10-CM

## 2025-05-14 DIAGNOSIS — E88.09 ALPHA-1-ANTICHYMOTRYPSIN DEFICIENCY: ICD-10-CM

## 2025-05-14 DIAGNOSIS — K76.6 PORTAL HYPERTENSION: ICD-10-CM

## 2025-05-14 DIAGNOSIS — K74.60 HEPATIC CIRRHOSIS, UNSPECIFIED HEPATIC CIRRHOSIS TYPE, UNSPECIFIED WHETHER ASCITES PRESENT: ICD-10-CM

## 2025-05-14 DIAGNOSIS — J43.8 EMPHYSEMA DUE TO ALPHA-1-ANTITRYPSIN DEFICIENCY: ICD-10-CM

## 2025-05-14 DIAGNOSIS — I85.11 SECONDARY ESOPHAGEAL VARICES WITH BLEEDING: ICD-10-CM

## 2025-05-14 DIAGNOSIS — Z83.49 FAMILY HISTORY OF ALPHA 1 ANTITRYPSIN DEFICIENCY: ICD-10-CM

## 2025-05-14 DIAGNOSIS — R18.8 ASCITES OF LIVER: ICD-10-CM

## 2025-05-14 DIAGNOSIS — K42.9 UMBILICAL HERNIA WITHOUT OBSTRUCTION AND WITHOUT GANGRENE: ICD-10-CM

## 2025-05-14 LAB
AMPHET UR QL SCN: NEGATIVE
BARBITURATE SCN PRESENT UR: NEGATIVE
BENZODIAZ UR QL SCN: NEGATIVE
CANNABINOIDS UR QL SCN: NEGATIVE
COCAINE UR QL SCN: NEGATIVE
CREAT UR-MCNC: 49 MG/DL (ref 15–325)
ETHANOL UR-MCNC: <10 MG/DL
METHADONE UR QL SCN: NEGATIVE
OPIATES UR QL SCN: NEGATIVE
PCP UR QL: NEGATIVE

## 2025-05-14 PROCEDURE — 99214 OFFICE O/P EST MOD 30 MIN: CPT | Mod: S$GLB,TXP,, | Performed by: INTERNAL MEDICINE

## 2025-05-14 PROCEDURE — 80307 DRUG TEST PRSMV CHEM ANLYZR: CPT | Mod: TXP | Performed by: INTERNAL MEDICINE

## 2025-05-14 PROCEDURE — 93975 VASCULAR STUDY: CPT | Mod: TC,TXP

## 2025-05-14 PROCEDURE — 1160F RVW MEDS BY RX/DR IN RCRD: CPT | Mod: CPTII,S$GLB,TXP, | Performed by: INTERNAL MEDICINE

## 2025-05-14 PROCEDURE — 1159F MED LIST DOCD IN RCRD: CPT | Mod: CPTII,S$GLB,TXP, | Performed by: INTERNAL MEDICINE

## 2025-05-14 PROCEDURE — 3008F BODY MASS INDEX DOCD: CPT | Mod: CPTII,S$GLB,TXP, | Performed by: INTERNAL MEDICINE

## 2025-05-14 PROCEDURE — 99999 PR PBB SHADOW E&M-EST. PATIENT-LVL IV: CPT | Mod: PBBFAC,TXP,, | Performed by: INTERNAL MEDICINE

## 2025-05-14 PROCEDURE — 3078F DIAST BP <80 MM HG: CPT | Mod: CPTII,S$GLB,TXP, | Performed by: INTERNAL MEDICINE

## 2025-05-14 PROCEDURE — 3074F SYST BP LT 130 MM HG: CPT | Mod: CPTII,S$GLB,TXP, | Performed by: INTERNAL MEDICINE

## 2025-05-14 RX ORDER — ALBUMIN HUMAN 250 G/1000ML
SOLUTION INTRAVENOUS
COMMUNITY
Start: 2024-12-05

## 2025-05-14 NOTE — LETTER
May 14, 2025        Sj Novoa  [address] [facility] [address]  Phone: [phone]  Fax: [phone]             [facility]  [address]  [facility] [address]  Phone: [phone]   Patient: Ami Austin   MR Number: [id number]   YOB: 1960   Date of Visit: 5/14/2025       Dear Dr. Sj Novoa    Thank you for referring Ami Austin to me for evaluation. Attached you will find relevant portions of my assessment and plan of care.    If you have questions, please do not hesitate to call me. I look forward to following Ami Austin along with you.    Sincerely,    Madelin Napier MD    Enclosure    If you would like to receive this communication electronically, please contact [email or web address] or [phone] to request [employer] access.    [employer] is a tool which provides read-only access to select patient information with whom you have a relationship. Its easy to use and provides real time access to review your patients record including encounter summaries, notes, results, and demographic information.    If you feel you have received this communication in error or would no longer like to receive these types of communications, please e-mail [email or web address]

## 2025-05-14 NOTE — PROGRESS NOTES
Transplant Hepatology   Transplant Evaluation Follow Up Note    Referring provider: No ref. provider found  PCP: No, Primary Doctor    Chief complaint: follow up of A1AT deficiency related cirrhosis    HPI: Ami Austin is a 64 y.o. female who presents to Transplant Hepatology Clinic for follow up of A1AT deficiency related cirrhosis. She is accompanied by her , Eric.    Last seen in Transplant Hepatology Clinic on 3/12/2025.     Interval Events:   - Feels ok today. She is fatigued and tired, but otherwise feels ok.  - She is backup for a liver transplant today.   - Ascites has not been accumulating as quickly since increasing doses of dieretics. Last paracentesis on 5/7/25 with 5L removed. Last paracentesis before this one was 2 months ago.   - Taking Lasix 40mg AM and 20mg PM, Aldactone 150mg qd (taking 50mg TID). Adhering to low salt diet.  - Adhering to high protein diet.   - Denies recent hospitalizations, ED visits.   - She denies signs of encephalopathy, jaundice, GI bleeding.    Past Medical History:   Diagnosis Date    Alpha-1-antitrypsin deficiency     Ascites     Emphysema, unspecified     Esophageal varices     Portal hypertension     Splenomegaly     Unspecified cirrhosis of liver        Past Surgical History:   Procedure Laterality Date    ESOPHAGOGASTRODUODENOSCOPY  06/18/2024    Grade III esophageal varices.    MECKEL DIVERTICULUM EXCISION      OVARIAN CYST REMOVAL         No family history on file.    Social History     Tobacco Use    Smoking status: Never     Passive exposure: Past    Smokeless tobacco: Never   Substance Use Topics    Alcohol use: Not Currently    Drug use: Never       Current Outpatient Medications   Medication Sig Dispense Refill    albumin human 25% 25 % bottle 100 ML albumin human,  MG/ML Injection   Quantity: 1    Bright SAUNDERS Jr, MD Start: 5-Dec-2024      albuterol (PROVENTIL/VENTOLIN HFA) 90 mcg/actuation inhaler 2 puffs 4 (four) times daily as needed.       "famotidine (PEPCID) 40 MG tablet Take 40 mg by mouth once daily.      furosemide (LASIX) 20 MG tablet Take 1 tablet (20 mg total) by mouth As instructed. Take 40 mg in the am Take 20 mg in pm 90 tablet 6    pantoprazole (PROTONIX) 40 MG tablet Take 40 mg by mouth every evening.      potassium chloride SA (K-DUR,KLOR-CON) 20 MEQ tablet Take 20 mEq by mouth once daily.      spironolactone (ALDACTONE) 50 MG tablet Take 150 mg by mouth once daily.      SYMBICORT 160-4.5 mcg/actuation HFAA Inhale 2 puffs into the lungs every 12 (twelve) hours.       No current facility-administered medications for this visit.       Review of patient's allergies indicates:   Allergen Reactions    Ephedrine Other (See Comments)     Hyperexcitability    House dust mite     Egg derived Hives and Nausea And Vomiting            Vitals:    05/14/25 0839   BP: 117/76   Pulse: 98   Resp: 18   Temp: 97.7 °F (36.5 °C)   TempSrc: Temporal   SpO2: 96%   Weight: 43.1 kg (95 lb 0.3 oz)   Height: 5' 3" (1.6 m)   Body mass index is 16.83 kg/m².    Physical Exam  Constitutional:       General: She is not in acute distress.     Appearance: She is underweight. She is not toxic-appearing.   Eyes:      General: No scleral icterus.  Cardiovascular:      Rate and Rhythm: Normal rate.   Pulmonary:      Effort: Pulmonary effort is normal.   Abdominal:      General: Abdomen is protuberant. There is no distension.      Palpations: There is fluid wave.      Tenderness: There is no abdominal tenderness.      Hernia: A hernia (Easily reducible, nontender to palpation.) is present. Hernia is present in the umbilical area.   Musculoskeletal:         General: No swelling.   Skin:     General: Skin is warm.      Coloration: Skin is not jaundiced.   Neurological:      General: No focal deficit present.      Mental Status: She is alert and oriented to person, place, and time.      Comments: No asterixis.   Psychiatric:         Thought Content: Thought content normal. " "        LABS: I personally reviewed pertinent laboratory findings.    Lab Results   Component Value Date    ALT 46 (H) 05/13/2025    AST 47 (H) 05/13/2025     (H) 11/21/2024    ALKPHOS 331 (H) 05/13/2025    BILITOT 3.3 (H) 05/13/2025       Lab Results   Component Value Date    WBC 9.53 05/13/2025    HGB 13.1 05/13/2025    HCT 39.1 05/13/2025     (H) 05/13/2025     05/13/2025       Lab Results   Component Value Date     (L) 05/13/2025    K 4.0 05/13/2025     05/13/2025    CO2 23 05/13/2025    BUN 28 (H) 05/13/2025    CREATININE 0.7 05/13/2025    CALCIUM 9.7 05/13/2025    ANIONGAP 8 05/13/2025       Lab Results   Component Value Date    INR 1.0 05/13/2025    INR 1.0 03/11/2025    INR 1.0 02/05/2025    PROTIME 10.9 05/13/2025       No results found for: "SMOOTHMUSCAB", "MITOAB"    Lab Results   Component Value Date    IRON 140 11/21/2024    TIBC 377 11/21/2024    FERRITIN 121 11/21/2024       Lab Results   Component Value Date    HEPBCAB Non-reactive 11/21/2024    HEPCAB Non-reactive 11/21/2024         No results found for: "TREVOR"    Imaging:   I personally reviewed recent imaging studies available on the chart and from outside medical records.      Assessment:  64 y.o. female with A1AT deficiency related cirrhosis. She is decompensated with ascites, history of variceal bleeding.    1. Decompensated liver disease    2. Alpha-1-antichymotrypsin deficiency    3. Ascites of liver    4. Portal hypertension    5. Secondary esophageal varices with bleeding    6. Sarcopenia    7. Umbilical hernia without obstruction and without gangrene    8. Organ transplant candidate    9. Family history of alpha 1 antitrypsin deficiency    10. Emphysema due to alpha-1-antitrypsin deficiency        MELD 3.0: 15 at 5/13/2025  3:34 PM  MELD-Na: 11 at 5/13/2025  3:34 PM  Calculated from:  Serum Creatinine: 0.7 mg/dL (Using min of 1 mg/dL) at 5/13/2025  3:34 PM  Serum Sodium: 134 mmol/L at 5/13/2025  3:34 " PM  Total Bilirubin: 3.3 mg/dL at 2025  3:34 PM  Serum Albumin: 3.4 g/dL at 2025  3:34 PM  INR(ratio): 1 at 2025  3:34 PM  Age at listin years  Sex: Female at 2025  3:34 PM      UNOS Patient Status  Functional Status: 50% - Requires considerable assistance and frequent medical care  Physical Capacity: Limited Mobility    Transplant Candidacy: Patient is a 64 y.o. female with end-stage liver disease secondary to A1AT deficiency related cirrhosis. Actively listed for liver transplant. Awaiting organ offers.    Recommendations:  Ascites/Edema: Improved. Continue Lasix 40mg in AM, 20mg PM, continue Aldactone 150mg qd. Last paracentesis 25 with 5L removed. Last paracentesis before this one was 2 months ago. Therapeutic paracentesis as needed, limit to 5L at a time. Na 2 g diet.    Encephalopathy: Not an active issue.    Variceal screening: Outside EGD in 2024 large EV treated with esophageal banding.     HCC screening: US abdomen w/o HCC 25. AFP 4.3 25. Repeat abdominal US and AFP every 6 months.     Immunizations: Not immune to HAV or HBV. Recommend vaccination for both    CRC screening: Colonoscopy in 2024 with 10 subcent polyps, 9 adenomas. Recommend repeat colonoscopy in 1 year.    Sarcopenia: BMI 16. Counseled regarding high protein diet, protein shakes, bedtime snack.     Emphysema: A1ATD. Seen by [facility] 25. Per Pulm team, given her ZZ phenotype, low alpha 1 level, and some emphysema, could consider prolastin infusions.     Umbilical Hernia: Small. Easily reducible. Recommend wearing abdominal binder to help prevent incarceration.     Living Donation: Reports several relatives have come forward as potential living donors.     Return to clinic in 2 months.    I have sent communication to the referring physician and/or primary care provider.    Madelin Napier MD  Staff Physician  Hepatology and Liver Transplant  [facility]  [facility]

## 2025-05-15 ENCOUNTER — TELEPHONE (OUTPATIENT)
Dept: TRANSPLANT | Facility: CLINIC | Age: 65
End: 2025-05-15
Payer: COMMERCIAL

## 2025-05-15 NOTE — TELEPHONE ENCOUNTER
"Pt was back up for unos [id number] but sent a message this am:"Having a rough time this morning and dont think I can or should fast today. Im sorry. I have had a headache since yesterday. This morning I have acid reflux bad. I also have been constipated a couple days. My belly hurts a lot.".  I called the pt and she was on her way home. She did not want to be back up anymore as she was not feeling well. Pt was fasting 6 hours prior to her liver US on WED @ 7 AM. She was to eat after the ultrasound and resume fasting for back up at 930AM. I did verify with the pt she was able to eat prior to fasting @ 930am WEd. .  Pt was then released to eat at 1pm by Alesha Freeman as the case was moved from WED to THURSDAY. Pt was to resume fasting at 8AM Thursday.      Pt stated this am she ate two bites and vomited. She is not feeling well and is on her way home.     Procurement notified.   "

## 2025-05-16 ENCOUNTER — TELEPHONE (OUTPATIENT)
Dept: TRANSPLANT | Facility: CLINIC | Age: 65
End: 2025-05-16
Payer: COMMERCIAL

## 2025-05-16 ENCOUNTER — PATIENT MESSAGE (OUTPATIENT)
Dept: TRANSPLANT | Facility: CLINIC | Age: 65
End: 2025-05-16
Payer: COMMERCIAL

## 2025-05-16 LAB
PLPETH BLD-MCNC: <10 NG/ML
POPETH BLD-MCNC: <10 NG/ML
TOXICOLOGIST REVIEW: NORMAL

## 2025-05-16 NOTE — TELEPHONE ENCOUNTER
PATIENT NAME: Ami Austin  [facility] #: [id number]    Lab Results   Component Value Date    CREATININE 0.7 05/13/2025     (L) 05/13/2025    BILITOT 3.3 (H) 05/13/2025    ALBUMIN 3.4 (L) 05/13/2025    INR 1.0 05/13/2025       Encephalopathy: none  Ascites: moderate  Dialysis: no    MELD 15     Recertification requestor: James Ferrell

## 2025-05-16 NOTE — TELEPHONE ENCOUNTER
Called patient to discuss issues w/ fasting as back-up for recent liver transplant.  Patient reports that she was not prepared for the extended time of fasting and was not feeling well (back ache and constipation).  She states that she usually consume a small amount every 2 hrs and was finding it difficult to eat.  She reports that once she did eat she became nauseated and vomited.  She voiced concerns related to fasting for long periods of times.  Informed patient that rather primary or backup fro liver transplant, she will need to fast for at least 8 hours.  She voiced understanding and reports that she will be better prepared next time.  Patient agrees to be back-up in the future but reports that she will need time.  Requesting to hold off on any back-up offers until July 2025.  Patient willing to accept primary offers.  On call comments updated.

## 2025-05-26 ENCOUNTER — TELEPHONE (OUTPATIENT)
Dept: TRANSPLANT | Facility: CLINIC | Age: 65
End: 2025-05-26
Payer: COMMERCIAL

## 2025-05-26 DIAGNOSIS — R60.9 FLUID RETENTION: Primary | ICD-10-CM

## 2025-05-26 NOTE — TELEPHONE ENCOUNTER
Patient's phone call returned.  Patient requesting a refill of spironolactone w/ updated prescription (increased from 50 mg to 150 mg/ daily).  Preferred pharmacy confirmed.  Refill request to sent to Hepatologist for authorization.  Patient also reports that Evenity is causing severe back pain.  She states that monthly injections recommended for osteoporosis.  She reports that since receiving the first dose, she has had worsening back pain.  Patient denies any injuries.  Patient reports that she was scheduled to receive second dose today but cancelled the injection due to pain.  She states that she left a message w/ ordering provider's office to see how to proceed.  Patient agrees to notify Transplant team of recommendations.    ===================================================================================    ----- Message from Yani sent at 5/26/2025  9:46 AM CDT -----  Regarding: refill Spironolactone / back pain advice  Contact: Patient contact @#   [phone]  Patient requesting refills ofspironolactone (ALDACTONE) 50 MG tablet (Dosage was increased  from 1-3) needs to have an updated script sent over to the pharmacy today. Please send to pharmacy on [id number] [place] [address] AT [facility]  [id number] [facility] [address]Fmhsh: [phone] Fax: [phone] states she has been having back pain since her first injection for osteoporosis (Evenity). PT cancelled the appt for the injection today and will f/u with rheum as well -  PT req call from nurse to discuss please advisePatient contact @#   [phone]

## 2025-05-27 RX ORDER — SPIRONOLACTONE 50 MG/1
150 TABLET, FILM COATED ORAL DAILY
Qty: 90 TABLET | Refills: 6 | Status: SHIPPED | OUTPATIENT
Start: 2025-05-27

## 2025-05-30 ENCOUNTER — TELEPHONE (OUTPATIENT)
Dept: TRANSPLANT | Facility: CLINIC | Age: 65
End: 2025-05-30
Payer: COMMERCIAL

## 2025-05-30 NOTE — TELEPHONE ENCOUNTER
"Patient's phone call returned.  Patient reports that she has a f/u w/ local provider for osteoporosis treatment on next week.  Patient cancelled last Evenity injection scheduled last week, d/t severe back pain thought to be caused by injection.  Patient scheduled to be seen to discuss alternate treatments.  Patient agrees to notify Transplant nurse coordinator of recommendations.    ==================================================================================      ----- Message from James Ferrell sent at 5/29/2025  3:56 PM CDT -----  Regarding: FW: call back    ----- Message -----  From: Saeed Pretty  Sent: 5/29/2025   1:45 PM CDT  To: [facility] Pre-Liver Transplant Clinical  Subject: call back                                        Consult/Advisory:  Name Of Caller: Self Contact Preference?:[phone] What is the nature of the call?: Calling to speak w/ James in regards to her making an appt with  rheum for 06/04/2025 11am requesting call back  Additional Notes:"Thank you for all that you do for our patients"  "

## 2025-06-23 ENCOUNTER — TELEPHONE (OUTPATIENT)
Dept: TRANSPLANT | Facility: CLINIC | Age: 65
End: 2025-06-23
Payer: COMMERCIAL

## 2025-06-23 NOTE — TELEPHONE ENCOUNTER
Phone call returned to patient's sister, Denisa w/ patient present.  Discussed current listing status.  Patient currently listed w/ MELD score 15.  Wait time may be longer than average.  Patient may want to consider living donation to be transplanted quicker.  Understanding voiced.  Also discussed patient's weight loss.  Patient now weighs less than 95lbs.  Will schedule f/u w/ dietician to provide options to help with weight and nourishment.  Patient's sister voiced understanding and gratitude.    =========================================================================================    Copied from CRM #[id number]. Topic: General Inquiry - Patient Advice  >> Jun 23, 2025  8:04 RAVI Lomeli wrote:  PT sister called requesting to speak to Nurse regarding PT status. Please reach out to sister at your convenience to discuss status and concerns of family    Sister (Denisa Burroughs)   Contact [phone] # [phone]

## 2025-07-03 ENCOUNTER — TELEPHONE (OUTPATIENT)
Dept: TRANSPLANT | Facility: CLINIC | Age: 65
End: 2025-07-03
Payer: COMMERCIAL

## 2025-07-03 NOTE — TELEPHONE ENCOUNTER
"Patient's phone call returned.  Appts scheduled next week in transplant clinic clarified.  Patient informed of appt dates/ times/ location.  She voiced understanding.  Patient also makes c/o muscle spasms in back. Asking what she should take for it.  She states that tylenol helps.  Reminded her not to take more than 2000 mg in a 24 hr period.  She voiced understanding.  Instructed her to contact PCP for further recommendations.  She verbalized understanding but states that local provider would like to know what is ok from a liver standpoint to take for muscle cramps.  Will review further w/ Hepatologist and update her accordingly.    =========================================================================================    Copied from CRM #[id number]. Topic: General Inquiry - Patient Advice  >> Jul 3, 2025  1:44 PM Saeed wrote:  Consult/Advisory:        Name Of Caller: Self     Contact Preference?:[phone]     What is the nature of the call?: Calling to speak w/  James in regards to someone question she has requesting call back      Additional Notes:  "Thank you for all that you do for our patients"  "

## 2025-07-08 ENCOUNTER — TELEPHONE (OUTPATIENT)
Dept: TRANSPLANT | Facility: CLINIC | Age: 65
End: 2025-07-08
Payer: COMMERCIAL

## 2025-07-08 ENCOUNTER — LAB VISIT (OUTPATIENT)
Dept: LAB | Facility: HOSPITAL | Age: 65
End: 2025-07-08
Payer: COMMERCIAL

## 2025-07-08 DIAGNOSIS — M62.84 SARCOPENIA: ICD-10-CM

## 2025-07-08 DIAGNOSIS — R18.8 ASCITES OF LIVER: ICD-10-CM

## 2025-07-08 DIAGNOSIS — Z83.49 FAMILY HISTORY OF ALPHA 1 ANTITRYPSIN DEFICIENCY: ICD-10-CM

## 2025-07-08 DIAGNOSIS — E88.01 EMPHYSEMA DUE TO ALPHA-1-ANTITRYPSIN DEFICIENCY: ICD-10-CM

## 2025-07-08 DIAGNOSIS — Z76.82 ORGAN TRANSPLANT CANDIDATE: ICD-10-CM

## 2025-07-08 DIAGNOSIS — I85.11 SECONDARY ESOPHAGEAL VARICES WITH BLEEDING: ICD-10-CM

## 2025-07-08 DIAGNOSIS — J43.8 EMPHYSEMA DUE TO ALPHA-1-ANTITRYPSIN DEFICIENCY: ICD-10-CM

## 2025-07-08 DIAGNOSIS — K74.60 DECOMPENSATED LIVER DISEASE: ICD-10-CM

## 2025-07-08 DIAGNOSIS — E88.09 ALPHA-1-ANTICHYMOTRYPSIN DEFICIENCY: ICD-10-CM

## 2025-07-08 DIAGNOSIS — K76.6 PORTAL HYPERTENSION: ICD-10-CM

## 2025-07-08 DIAGNOSIS — K72.90 DECOMPENSATED LIVER DISEASE: ICD-10-CM

## 2025-07-08 DIAGNOSIS — K42.9 UMBILICAL HERNIA WITHOUT OBSTRUCTION AND WITHOUT GANGRENE: ICD-10-CM

## 2025-07-08 LAB
ABSOLUTE EOSINOPHIL (OHS): 0.02 K/UL
ABSOLUTE MONOCYTE (OHS): 0.75 K/UL (ref 0.3–1)
ABSOLUTE NEUTROPHIL COUNT (OHS): 8.15 K/UL (ref 1.8–7.7)
ALBUMIN SERPL BCP-MCNC: 3.4 G/DL (ref 3.5–5.2)
ALP SERPL-CCNC: 241 UNIT/L (ref 40–150)
ALT SERPL W/O P-5'-P-CCNC: 40 UNIT/L (ref 10–44)
ANION GAP (OHS): 10 MMOL/L (ref 8–16)
AST SERPL-CCNC: 38 UNIT/L (ref 11–45)
BASOPHILS # BLD AUTO: 0.03 K/UL
BASOPHILS NFR BLD AUTO: 0.3 %
BILIRUB SERPL-MCNC: 2.2 MG/DL (ref 0.1–1)
BUN SERPL-MCNC: 28 MG/DL (ref 8–23)
CALCIUM SERPL-MCNC: 9.3 MG/DL (ref 8.7–10.5)
CHLORIDE SERPL-SCNC: 103 MMOL/L (ref 95–110)
CO2 SERPL-SCNC: 24 MMOL/L (ref 23–29)
CREAT SERPL-MCNC: 0.6 MG/DL (ref 0.5–1.4)
ERYTHROCYTE [DISTWIDTH] IN BLOOD BY AUTOMATED COUNT: 15.3 % (ref 11.5–14.5)
GFR SERPLBLD CREATININE-BSD FMLA CKD-EPI: >60 ML/MIN/1.73/M2
GLUCOSE SERPL-MCNC: 128 MG/DL (ref 70–110)
HCT VFR BLD AUTO: 37.2 % (ref 37–48.5)
HGB BLD-MCNC: 12.2 GM/DL (ref 12–16)
IMM GRANULOCYTES # BLD AUTO: 0.04 K/UL (ref 0–0.04)
IMM GRANULOCYTES NFR BLD AUTO: 0.4 % (ref 0–0.5)
INR PPP: 1 (ref 0.8–1.2)
LYMPHOCYTES # BLD AUTO: 1.03 K/UL (ref 1–4.8)
MCH RBC QN AUTO: 33.6 PG (ref 27–31)
MCHC RBC AUTO-ENTMCNC: 32.8 G/DL (ref 32–36)
MCV RBC AUTO: 103 FL (ref 82–98)
NUCLEATED RBC (/100WBC) (OHS): 0 /100 WBC
PLATELET # BLD AUTO: 277 K/UL (ref 150–450)
PMV BLD AUTO: 8.9 FL (ref 9.2–12.9)
POTASSIUM SERPL-SCNC: 3.6 MMOL/L (ref 3.5–5.1)
PROT SERPL-MCNC: 7.1 GM/DL (ref 6–8.4)
PROTHROMBIN TIME: 11 SECONDS (ref 9–12.5)
RBC # BLD AUTO: 3.63 M/UL (ref 4–5.4)
RELATIVE EOSINOPHIL (OHS): 0.2 %
RELATIVE LYMPHOCYTE (OHS): 10.3 % (ref 18–48)
RELATIVE MONOCYTE (OHS): 7.5 % (ref 4–15)
RELATIVE NEUTROPHIL (OHS): 81.3 % (ref 38–73)
SODIUM SERPL-SCNC: 137 MMOL/L (ref 136–145)
WBC # BLD AUTO: 10.02 K/UL (ref 3.9–12.7)

## 2025-07-08 PROCEDURE — 80321 ALCOHOLS BIOMARKERS 1OR 2: CPT | Mod: TXP

## 2025-07-08 PROCEDURE — 85610 PROTHROMBIN TIME: CPT | Mod: TXP

## 2025-07-08 PROCEDURE — 85025 COMPLETE CBC W/AUTO DIFF WBC: CPT | Mod: TXP

## 2025-07-08 PROCEDURE — 82040 ASSAY OF SERUM ALBUMIN: CPT | Mod: TXP

## 2025-07-08 PROCEDURE — 36415 COLL VENOUS BLD VENIPUNCTURE: CPT | Mod: TXP

## 2025-07-08 NOTE — TELEPHONE ENCOUNTER
Patient scheduled for f/u appts in transplant clinic this week.  Patient requesting to have toenails cut while on campus.  Availability in Podiatry reviewed.  First available appt for nail care is 8/4.  Patient notified.  Understanding voiced.  Patient will arrange nail care locally.

## 2025-07-09 ENCOUNTER — NUTRITION (OUTPATIENT)
Dept: TRANSPLANT | Facility: CLINIC | Age: 65
End: 2025-07-09
Payer: COMMERCIAL

## 2025-07-09 ENCOUNTER — OFFICE VISIT (OUTPATIENT)
Dept: TRANSPLANT | Facility: CLINIC | Age: 65
End: 2025-07-09
Payer: COMMERCIAL

## 2025-07-09 VITALS
HEART RATE: 108 BPM | TEMPERATURE: 97 F | WEIGHT: 96.81 LBS | SYSTOLIC BLOOD PRESSURE: 98 MMHG | DIASTOLIC BLOOD PRESSURE: 73 MMHG | RESPIRATION RATE: 20 BRPM | HEIGHT: 63 IN | OXYGEN SATURATION: 96 % | BODY MASS INDEX: 17.15 KG/M2

## 2025-07-09 DIAGNOSIS — Z76.82 ORGAN TRANSPLANT CANDIDATE: ICD-10-CM

## 2025-07-09 DIAGNOSIS — K72.90 DECOMPENSATED LIVER DISEASE: ICD-10-CM

## 2025-07-09 DIAGNOSIS — E88.01 EMPHYSEMA DUE TO ALPHA-1-ANTITRYPSIN DEFICIENCY: ICD-10-CM

## 2025-07-09 DIAGNOSIS — M62.84 SARCOPENIA: ICD-10-CM

## 2025-07-09 DIAGNOSIS — R18.8 ASCITES OF LIVER: ICD-10-CM

## 2025-07-09 DIAGNOSIS — E88.09 ALPHA-1-ANTICHYMOTRYPSIN DEFICIENCY: ICD-10-CM

## 2025-07-09 DIAGNOSIS — K76.6 PORTAL HYPERTENSION: ICD-10-CM

## 2025-07-09 DIAGNOSIS — J43.8 EMPHYSEMA DUE TO ALPHA-1-ANTITRYPSIN DEFICIENCY: ICD-10-CM

## 2025-07-09 DIAGNOSIS — Z76.82 ORGAN TRANSPLANT CANDIDATE: Primary | ICD-10-CM

## 2025-07-09 DIAGNOSIS — K42.9 UMBILICAL HERNIA WITHOUT OBSTRUCTION AND WITHOUT GANGRENE: ICD-10-CM

## 2025-07-09 DIAGNOSIS — K74.60 DECOMPENSATED LIVER DISEASE: ICD-10-CM

## 2025-07-09 DIAGNOSIS — I85.11 SECONDARY ESOPHAGEAL VARICES WITH BLEEDING: ICD-10-CM

## 2025-07-09 PROCEDURE — 99999 PR PBB SHADOW E&M-EST. PATIENT-LVL IV: CPT | Mod: PBBFAC,TXP,, | Performed by: INTERNAL MEDICINE

## 2025-07-09 NOTE — PROGRESS NOTES
Transplant Hepatology   Transplant Evaluation Follow Up Note    Referring provider: No ref. provider found  PCP: No, Primary Doctor    Chief complaint: follow up of A1AT deficiency related cirrhosis    HPI: Ami Austin is a 64 y.o. female who presents to Transplant Hepatology Clinic for follow up of A1AT deficiency related cirrhosis. She is accompanied by her , Eric.    Last seen in Transplant Hepatology Clinic on 5/14/2025.     Interval Events:   - Presented to ED on 7/8/25 for kidney stone. She thinks she has passed kidney stone.   - Feels ok today. She is fatigued and tired, but otherwise feels ok.  - Ascites has reaccumulated. States diuretic schedule was disrupted due ED visit, traveling to [place].  - Taking Lasix 40mg AM and 20mg PM, Aldactone 150mg qd (taking 50mg TID). Trying to adhering to low salt diet, although has some dietary indiscretions specially when traveling to [place].  - Adhering to high protein diet.   - She denies signs of encephalopathy, jaundice, GI bleeding.    Past Medical History:   Diagnosis Date    Alpha-1-antitrypsin deficiency     Ascites     Emphysema, unspecified     Esophageal varices     Portal hypertension     Splenomegaly     Unspecified cirrhosis of liver        Past Surgical History:   Procedure Laterality Date    ESOPHAGOGASTRODUODENOSCOPY  06/18/2024    Grade III esophageal varices.    MECKEL DIVERTICULUM EXCISION      OVARIAN CYST REMOVAL         No family history on file.    Social History     Tobacco Use    Smoking status: Never     Passive exposure: Past    Smokeless tobacco: Never   Substance Use Topics    Alcohol use: Not Currently    Drug use: Never       Current Outpatient Medications   Medication Sig Dispense Refill    albuterol (PROVENTIL/VENTOLIN HFA) 90 mcg/actuation inhaler 2 puffs 4 (four) times daily as needed.      famotidine (PEPCID) 40 MG tablet Take 40 mg by mouth once daily.      furosemide (LASIX) 20 MG tablet Take 1 tablet (20 mg  "total) by mouth As instructed. Take 40 mg in the am Take 20 mg in pm 90 tablet 6    pantoprazole (PROTONIX) 40 MG tablet Take 40 mg by mouth every evening.      potassium chloride SA (K-DUR,KLOR-CON) 20 MEQ tablet Take 20 mEq by mouth once daily.      spironolactone (ALDACTONE) 50 MG tablet Take 3 tablets (150 mg total) by mouth once daily. 90 tablet 6    SYMBICORT 160-4.5 mcg/actuation HFAA Inhale 2 puffs into the lungs every 12 (twelve) hours.       No current facility-administered medications for this visit.       Review of patient's allergies indicates:   Allergen Reactions    Ephedrine Other (See Comments)     Hyperexcitability    House dust mite     Egg derived Hives and Nausea And Vomiting            Vitals:    07/09/25 0902   BP: 98/73   Pulse: 108   Resp: 20   Temp: 97.3 °F (36.3 °C)   TempSrc: Temporal   SpO2: 96%   Weight: 43.9 kg (96 lb 12.5 oz)   Height: 5' 3" (1.6 m)   Body mass index is 17.14 kg/m².    Physical Exam  Constitutional:       General: She is not in acute distress.     Appearance: She is underweight. She is not toxic-appearing.   Eyes:      General: No scleral icterus.  Cardiovascular:      Rate and Rhythm: Normal rate.   Pulmonary:      Effort: Pulmonary effort is normal.   Abdominal:      General: Abdomen is protuberant. There is no distension.      Palpations: There is fluid wave.      Tenderness: There is no abdominal tenderness.      Hernia: A hernia (Easily reducible, nontender to palpation.) is present. Hernia is present in the umbilical area.   Musculoskeletal:         General: No swelling.   Skin:     General: Skin is warm.      Coloration: Skin is not jaundiced.   Neurological:      General: No focal deficit present.      Mental Status: She is alert and oriented to person, place, and time.      Comments: No asterixis.   Psychiatric:         Thought Content: Thought content normal.         LABS: I personally reviewed pertinent laboratory findings.    Lab Results   Component Value " "Date    ALT 40 07/08/2025    AST 38 07/08/2025     (H) 11/21/2024    ALKPHOS 241 (H) 07/08/2025    BILITOT 2.2 (H) 07/08/2025       Lab Results   Component Value Date    WBC 10.02 07/08/2025    HGB 12.2 07/08/2025    HCT 37.2 07/08/2025     (H) 07/08/2025     07/08/2025       Lab Results   Component Value Date     07/08/2025    K 3.6 07/08/2025     07/08/2025    CO2 24 07/08/2025    BUN 28 (H) 07/08/2025    CREATININE 0.6 07/08/2025    CALCIUM 9.3 07/08/2025    ANIONGAP 10 07/08/2025       Lab Results   Component Value Date    INR 1.0 07/08/2025    INR 1.0 05/13/2025    INR 1.0 03/11/2025    PROTIME 11.0 07/08/2025    PROTIME 10.9 05/13/2025       No results found for: "SMOOTHMUSCAB", "MITOAB"    Lab Results   Component Value Date    IRON 140 11/21/2024    TIBC 377 11/21/2024    FERRITIN 121 11/21/2024       Lab Results   Component Value Date    HEPBCAB Non-reactive 11/21/2024    HEPCAB Non-reactive 11/21/2024         No results found for: "TREVOR"    Imaging:   I personally reviewed recent imaging studies available on the chart and from outside medical records.      Assessment:  64 y.o. female with A1AT deficiency related cirrhosis. She is decompensated with ascites, history of variceal bleeding.    1. Alpha-1-antichymotrypsin deficiency    2. Decompensated liver disease    3. Ascites of liver    4. Portal hypertension    5. Secondary esophageal varices with bleeding    6. Umbilical hernia without obstruction and without gangrene    7. Sarcopenia    8. Emphysema due to alpha-1-antitrypsin deficiency    9. Organ transplant candidate          MELD 3.0: 11 at 7/8/2025  4:03 PM  MELD-Na: 9 at 7/8/2025  4:03 PM  Calculated from:  Serum Creatinine: 0.6 mg/dL (Using min of 1 mg/dL) at 7/8/2025  4:03 PM  Serum Sodium: 137 mmol/L at 7/8/2025  4:03 PM  Total Bilirubin: 2.2 mg/dL at 7/8/2025  4:03 PM  Serum Albumin: 3.4 g/dL at 7/8/2025  4:03 PM  INR(ratio): 1 at 7/8/2025  4:03 PM  Age at " listin years  Sex: Female at 2025  4:03 PM      UNOS Patient Status  Functional Status: 40% - Disabled: requires special care and assistance  Physical Capacity: Limited Mobility    Transplant Candidacy: Patient is a 64 y.o. female with end-stage liver disease secondary to A1AT deficiency related cirrhosis. Actively listed for liver transplant. Awaiting organ offers.    Discussed today my concerns about her pathway to transplant given low MELD score and patient not wanting to be a backup for liver offers. She continues to explore living donation, but has not been able to identify a living donor. Her main issue at this time is difficult to control ascites and requiring frequent paracentesis. We discussed TIPS as a potential options for treatment of ascites. Patient is interested in TIPS.     Recommendations:  Ascites/Edema: Large volume ascites. Continue Lasix 40mg in AM, 20mg PM, continue Aldactone 150mg qd. Last paracentesis ~2 weeks ago. Therapeutic paracentesis as needed, limit to 5L at a time. Na 2 g diet. Plan to evaluate for TIPS.     Encephalopathy: Not an active issue.    Variceal screening: Outside EGD in 2024 large EV treated with esophageal banding.     HCC screening: US abdomen w/o HCC 25. AFP 4.3 25. Repeat abdominal US and AFP every 6 months.     Immunizations: Not immune to HAV or HBV. Recommend vaccination for both    CRC screening: Colonoscopy in 2024 with 10 subcent polyps, 9 adenomas. Recommend repeat colonoscopy in 2 years.    Sarcopenia: BMI 17. I have counseled regarding high protein diet, protein shakes, bedtime snack.     Emphysema: A1ATD. Seen by [facility] 25. Per Pulm team, given her ZZ phenotype, low alpha 1 level, and some emphysema, could consider prolastin infusions.     Umbilical Hernia: Small. Easily reducible. Recommend wearing abdominal binder to help prevent incarceration.     Living Donation: Continues to explore living donation,  but has not been able to identify a living donor.    Compression Fractures: Outside CT abd 7/8/25 done in ED showed new T11, T12, L1, L3 compression deformities. Patient with known osteoporosis, and is receiving treatment locally. Recommend continuing to follow with local rheumatology.    Return to clinic in 2 months.    I have sent communication to the referring physician and/or primary care provider.    Madelin Napier MD  Staff Physician  Hepatology and Liver Transplant  [facility]  [facility] [place]

## 2025-07-09 NOTE — Clinical Note
Please request outside CT abd w contrast done at local ED on 7/8/25. Will submit for review in IR conference and consider TIPS. No med changes.  RTC in 2 months.

## 2025-07-09 NOTE — PROGRESS NOTES
"  REASON FOR VISIT: Low BMI, significant weight loss within the last 6 months- 1 year     PAST MEDICAL HX:   Past Medical History:   Diagnosis Date    Alpha-1-antitrypsin deficiency     Ascites     Emphysema, unspecified     Esophageal varices     Portal hypertension     Splenomegaly     Unspecified cirrhosis of liver      WT:  Wt Readings from Last 20 Encounters:   07/09/25 43.9 kg (96 lb 12.5 oz)   05/14/25 43.1 kg (95 lb 0.3 oz)   03/12/25 45.6 kg (100 lb 8.5 oz)   02/05/25 46.3 kg (102 lb 1.2 oz)   02/05/25 45.8 kg (101 lb)   02/05/25 46.3 kg (102 lb 1.2 oz)   11/22/24 44.2 kg (97 lb 7.1 oz)   11/22/24 44.2 kg (97 lb 7.1 oz)   11/21/24 44.2 kg (97 lb 7.1 oz)   11/21/24 43.5 kg (96 lb)   10/25/24 49.9 kg (110 lb 0.2 oz)   08/12/24 47.9 kg (105 lb 9.6 oz)     BMI:  Estimated body mass index is 17.14 kg/m² as calculated from the following:    Height as of an earlier encounter on 7/9/25: 5' 3" (1.6 m).    Weight as of an earlier encounter on 7/9/25: 43.9 kg (96 lb 12.5 oz).    LABS:   BMP  Lab Results   Component Value Date     07/08/2025    K 3.6 07/08/2025     07/08/2025    CO2 24 07/08/2025    BUN 28 (H) 07/08/2025    CREATININE 0.6 07/08/2025    CALCIUM 9.3 07/08/2025    ANIONGAP 10 07/08/2025    EGFRNORACEVR >60 07/08/2025     NUTRITION HX: Noted muscle wasting. Pt loves to cook. Pt with food sensitivities to gluten, dairy containing products. Allergic to eggs. Able to eat fruit sparingly depending on GERD. -130 lbs. Has lost around 30 lbs within the last 6 months-1 year. Noted sarcopenia. Noticeable muscle wasting in upper and lower extremities. Eating several small meals/day as tolerated. Recently got an airfryer and has been trying new recipes. Found a gluten free pizza she enjoys. Previously taking OWYN protein supplement, stopped about 1 month ago d/t health issues and decreased appetite. Enjoys fish, chicken, and burgers. Recently started taking nectar (electrolyte supplement) with her " water to stay hydrated. Waiting to pass a kidney stone. Pt drinking around 60 oz of water/day.     INTERVENTION/EDUCATION: Reviewed High-Calorie, High Protein Nutrition Therapy handout (general tips, sample menus). Included recipe ideas and  high protein, high calorie food options.       Patient voiced understanding of education & goals. Contact information was provided & will f/u as needed at clinic visits.     Consultation Time: 15 minutes note

## 2025-07-09 NOTE — LETTER
July 9, 2025        Sj Novoa  [address] [facility] [address]  Phone: [phone]  Fax: [phone]             [facility]  [address]  [facility] [address]  Phone: [phone]   Patient: Ami Austin   MR Number: [id number]   YOB: 1960   Date of Visit: 7/9/2025       Dear Dr. Sj Novoa    Thank you for referring Ami Austin to me for evaluation. Attached you will find relevant portions of my assessment and plan of care.    If you have questions, please do not hesitate to call me. I look forward to following Ami Austin along with you.    Sincerely,    Madelin Napier MD    Enclosure    If you would like to receive this communication electronically, please contact [email or web address] or [phone] to request "[employer] Link access.    "[employer] Link is a tool which provides read-only access to select patient information with whom you have a relationship. Its easy to use and provides real time access to review your patients record including encounter summaries, notes, results, and demographic information.    If you feel you have received this communication in error or would no longer like to receive these types of communications, please e-mail [email or web address]

## 2025-07-09 NOTE — Clinical Note
Hi - this lady was seen in your clinic some months back. any chance we could get this lady prolastin infusions?

## 2025-07-10 ENCOUNTER — PATIENT MESSAGE (OUTPATIENT)
Dept: TRANSPLANT | Facility: CLINIC | Age: 65
End: 2025-07-10
Payer: COMMERCIAL

## 2025-07-11 ENCOUNTER — TELEPHONE (OUTPATIENT)
Dept: TRANSPLANT | Facility: CLINIC | Age: 65
End: 2025-07-11
Payer: COMMERCIAL

## 2025-07-11 LAB
PLPETH BLD-MCNC: <10 NG/ML
POPETH BLD-MCNC: <10 NG/ML
TOXICOLOGIST REVIEW: NORMAL

## 2025-07-11 NOTE — TELEPHONE ENCOUNTER
Ami Austin  [id number]    Presenting Provider: Madelin Napier MD    Indication for review: Treatment recommendations - ok to proceed with TIPS?    Listing status: Listed     Summary: 64 y.o. female who presents to Transplant Hepatology Clinic for follow up of A1AT deficiency related cirrhosis complicated by ascites requiring paracentesis, umbilical hernia.     Low MELD score. Patient not interested in being backup candidate. TIPS was discussed at last clinic visit.     Labs:      Latest Ref Rng & Units 7/8/2025     4:03 PM 5/13/2025     3:34 PM 3/11/2025     4:13 PM 2/17/2025    12:00 AM 2/5/2025     9:18 AM 11/21/2024     7:20 AM 10/25/2024    11:25 AM   Lab Results for Liver Discussion   MELD/PELD  9 11 9  10 11 10   Creatinine 0.5 - 1.4 mg/dL 0.6  0.7  0.8   0.7  0.7  0.8    Ext Creatinine mg/dL    0.7       Total Bilirubin 0.1 - 1.0 mg/dL 2.2  3.3  2.0   2.9  3.0  2.3    Ext Total Bilirubin     2.4       AST 11 - 45 unit/L 38  47  58   61  56  54    Ext AST     61       ALT 10 - 44 unit/L 40  46  47   53  43  43    Ext ALT     53       Alk Phos 40 - 150 unit/L 241  331  309   278  275  267    Ext Alk Phos     344       INR 0.8 - 1.2 1.0  1.0  1.0   1.0  1.0  1.0    Platelets 150 - 450 K/uL 277  243  230   206  228  208    AFP <=8.4 ng/mL  4.3     3.6     Albumin 3.5 - 5.2 g/dL 3.4  3.4  3.2   3.2  3.2  3.2    Sodium 136 - 145 mmol/L 137  134  133   132  131  138          Current Discussion/Plan:

## 2025-07-14 ENCOUNTER — TELEPHONE (OUTPATIENT)
Dept: TRANSPLANT | Facility: CLINIC | Age: 65
End: 2025-07-14
Payer: COMMERCIAL

## 2025-07-14 NOTE — TELEPHONE ENCOUNTER
Received phone call from Phone  w/ patient on line.  Patient voiced concerns r/t her transplant candidacy since her MELD score is low and she is underweight.  Patient became emotional and cried when discussing her listing status and voiced anxiety about possibly dying since she cannot have a liver transplant.   Spoke w/ patient for approx 25 minutes.  All questions answered and concerns regarding transplant listing/ candidacy answered and addressed. Reassured patient that she remains on the list although MELD score has dropped. Explained that w/ a lower MELD score, the avenue to transplant may be a little more challenging, and the wait time longer.  She voiced understanding.  Encouraged her to identify potential living donors.  She agrees to do so.  Notes in Epic reviewed.  Briefly discussed possible TIPS insertion.  Informed patient of plan to discuss this possibility w/ the team and that she will be notified of recommendations/ plan.  She voiced understanding.

## 2025-07-15 ENCOUNTER — CONFERENCE (OUTPATIENT)
Dept: TRANSPLANT | Facility: CLINIC | Age: 65
End: 2025-07-15
Payer: COMMERCIAL

## 2025-07-15 NOTE — TELEPHONE ENCOUNTER
Liver Transplant Committee Discussion     Patient Name: Ami Austin   : 1960  MRN: [id number]    Requested by: Dr. Madelin Napier    Day to be discussed: Liver discussion days: Tuesday    Transplant Coordinator: Liver Coordinators: James Ferrell    Patient Status: outpatient    Transplant Status: transplant status: Waitlist    Reason for Discussion:    64 y.o. female who presents to Transplant Hepatology Clinic for follow up of A1AT deficiency related cirrhosis complicated by ascites requiring paracentesis, umbilical hernia. Low MELD score. Patient not interested in being backup candidate. TIPS was discussed at last clinic visit.     Plan:  Patient to remain ACTIVE on the liver transplant wait-list.  Consult IR for TIPS insertion.  Patient to follow-up w/ local Endocrinologist for osteoporosis.

## 2025-07-18 DIAGNOSIS — R18.8 ASCITES OF LIVER: Primary | ICD-10-CM

## 2025-07-18 DIAGNOSIS — K74.60 DECOMPENSATED LIVER DISEASE: ICD-10-CM

## 2025-07-18 DIAGNOSIS — K76.6 PORTAL HYPERTENSION: ICD-10-CM

## 2025-07-18 DIAGNOSIS — K72.90 DECOMPENSATED LIVER DISEASE: ICD-10-CM

## 2025-07-21 ENCOUNTER — TELEPHONE (OUTPATIENT)
Dept: INTERVENTIONAL RADIOLOGY/VASCULAR | Facility: CLINIC | Age: 65
End: 2025-07-21
Payer: COMMERCIAL

## 2025-07-22 ENCOUNTER — OFFICE VISIT (OUTPATIENT)
Dept: INTERVENTIONAL RADIOLOGY/VASCULAR | Facility: CLINIC | Age: 65
End: 2025-07-22
Payer: COMMERCIAL

## 2025-07-22 VITALS
HEART RATE: 118 BPM | DIASTOLIC BLOOD PRESSURE: 66 MMHG | BODY MASS INDEX: 15.64 KG/M2 | SYSTOLIC BLOOD PRESSURE: 91 MMHG | WEIGHT: 88.31 LBS

## 2025-07-22 DIAGNOSIS — R18.8 ASCITES OF LIVER: ICD-10-CM

## 2025-07-22 DIAGNOSIS — I85.11 SECONDARY ESOPHAGEAL VARICES WITH BLEEDING: ICD-10-CM

## 2025-07-22 DIAGNOSIS — K76.6 PORTAL HYPERTENSION: Primary | ICD-10-CM

## 2025-07-22 PROCEDURE — 99999 PR PBB SHADOW E&M-EST. PATIENT-LVL III: CPT | Mod: PBBFAC,TXP,, | Performed by: FAMILY MEDICINE

## 2025-07-22 PROCEDURE — 3078F DIAST BP <80 MM HG: CPT | Mod: CPTII,NTX,S$GLB, | Performed by: FAMILY MEDICINE

## 2025-07-22 PROCEDURE — 1159F MED LIST DOCD IN RCRD: CPT | Mod: CPTII,NTX,S$GLB, | Performed by: FAMILY MEDICINE

## 2025-07-22 PROCEDURE — 99204 OFFICE O/P NEW MOD 45 MIN: CPT | Mod: NTX,S$GLB,, | Performed by: FAMILY MEDICINE

## 2025-07-22 PROCEDURE — 3074F SYST BP LT 130 MM HG: CPT | Mod: CPTII,NTX,S$GLB, | Performed by: FAMILY MEDICINE

## 2025-07-22 PROCEDURE — 3008F BODY MASS INDEX DOCD: CPT | Mod: CPTII,NTX,S$GLB, | Performed by: FAMILY MEDICINE

## 2025-07-22 PROCEDURE — 1160F RVW MEDS BY RX/DR IN RCRD: CPT | Mod: CPTII,NTX,S$GLB, | Performed by: FAMILY MEDICINE

## 2025-07-22 RX ORDER — IBANDRONATE SODIUM 150 MG/1
150 TABLET, FILM COATED ORAL
COMMUNITY

## 2025-07-22 NOTE — PROGRESS NOTES
Subjective     Patient ID: Ami Austin is a 64 y.o. female.    Chief Complaint: Ascites    Patient referred to Interventional Radiology by Karthikeyan Yusuf MD for evaluation of refractory ascites. Patient has a history of A1AT deficiency related cirrhosis. She has complaints of requiring large volume paracentesis every 3-4 weeks where 5-6.5L of ascites are drained each time. She endorses adherence with her diuretics. She complains of fatigue, abdominal distention, abdominal pain, decreased appetite, and dyspnea with exertion with ascites accumulation. She is accompanied by her .    Reviewed hepatology progress note.    Review of Systems   Constitutional:  Positive for appetite change (decreased with ascites accumulation) and fatigue (with exertion). Negative for activity change, chills and fever.   Respiratory:  Positive for shortness of breath (with ascites accumulation). Negative for cough, wheezing and stridor.    Cardiovascular:  Negative for chest pain, palpitations and leg swelling.   Gastrointestinal:  Positive for abdominal distention (with ascites) and abdominal pain (with ascites accumulation). Negative for constipation, diarrhea, nausea and vomiting.   Musculoskeletal:  Positive for back pain (has compression fracture).          Objective     Physical Exam  Constitutional:       General: She is not in acute distress.     Appearance: She is well-developed. She is not diaphoretic.   HENT:      Head: Normocephalic and atraumatic.   Pulmonary:      Effort: Pulmonary effort is normal. No respiratory distress.   Neurological:      Mental Status: She is alert and oriented to person, place, and time.   Psychiatric:         Behavior: Behavior normal.         Thought Content: Thought content normal.         Judgment: Judgment normal.          Assessment and Plan     1. Portal hypertension  -     CT Abdomen With IV Contrast NO Oral Contrast; Future; Expected date: 07/22/2025  -     Creatinine, serum;  Future; Expected date: 07/22/2025  -     Echo; Future; Expected date: 07/22/2025  -     IR TIPS Insertion; Future; Expected date: 07/22/2025  -     IR Paracentesis with Imaging; Future; Expected date: 07/22/2025    2. Secondary esophageal varices with bleeding  -     CT Abdomen With IV Contrast NO Oral Contrast; Future; Expected date: 07/22/2025  -     Creatinine, serum; Future; Expected date: 07/22/2025  -     Echo; Future; Expected date: 07/22/2025  -     IR TIPS Insertion; Future; Expected date: 07/22/2025  -     IR Paracentesis with Imaging; Future; Expected date: 07/22/2025    3. Ascites of liver  -     CT Abdomen With IV Contrast NO Oral Contrast; Future; Expected date: 07/22/2025  -     Creatinine, serum; Future; Expected date: 07/22/2025  -     Echo; Future; Expected date: 07/22/2025  -     IR TIPS Insertion; Future; Expected date: 07/22/2025  -     IR Paracentesis with Imaging; Future; Expected date: 07/22/2025        Discussed case with Dr. Rogers. Explained to patient can offer transjugular intrahepatic portosystemic shunt (TIPS) placement. Discussed how the procedure will be performed, risks (including, but not limited to, pain, bleeding, infection, damage to nearby structures, potential for cardiac arrest, and the need for additional procedures), benefits, possible complications (such as hepatic encephalopathy), pre-post procedure expectations, and alternatives. The patient voices understanding and all questions have been answered. Explained would need updated TPCT abdomen and 2D ECHO prior to scheduling TIPS. Patient asks if she can have these done in [place]. Reassured if she can have the images and report for the CT and the report for the ECHO, that should be sufficient. The patient agrees to proceed as planned. She will contact us once she has obtained TPCT and 2D ECHO. Clinic information provided.

## 2025-07-25 ENCOUNTER — TELEPHONE (OUTPATIENT)
Dept: INTERVENTIONAL RADIOLOGY/VASCULAR | Facility: CLINIC | Age: 65
End: 2025-07-25
Payer: COMMERCIAL

## 2025-08-13 ENCOUNTER — EPISODE CHANGES (OUTPATIENT)
Dept: TRANSPLANT | Facility: CLINIC | Age: 65
End: 2025-08-13

## 2025-08-13 ENCOUNTER — HOSPITAL ENCOUNTER (OUTPATIENT)
Facility: HOSPITAL | Age: 65
LOS: 1 days | Discharge: HOME | End: 2025-08-14
Attending: SURGERY | Admitting: STUDENT IN AN ORGANIZED HEALTH CARE EDUCATION/TRAINING PROGRAM
Payer: COMMERCIAL

## 2025-08-13 ENCOUNTER — TELEPHONE (OUTPATIENT)
Dept: TRANSPLANT | Facility: CLINIC | Age: 65
End: 2025-08-13
Payer: COMMERCIAL

## 2025-08-13 DIAGNOSIS — K72.10 END STAGE LIVER DISEASE: Primary | ICD-10-CM

## 2025-08-13 DIAGNOSIS — K74.60 DECOMPENSATED LIVER DISEASE: Primary | ICD-10-CM

## 2025-08-13 DIAGNOSIS — K72.90 DECOMPENSATED LIVER DISEASE: Primary | ICD-10-CM

## 2025-08-13 DIAGNOSIS — R18.8 OTHER ASCITES: ICD-10-CM

## 2025-08-13 DIAGNOSIS — Z01.818 PRE-OP EXAM: ICD-10-CM

## 2025-08-13 DIAGNOSIS — E88.09 ALPHA-1-ANTICHYMOTRYPSIN DEFICIENCY: ICD-10-CM

## 2025-08-13 LAB
ABSOLUTE EOSINOPHIL (OHS): 0.03 K/UL
ABSOLUTE MONOCYTE (OHS): 0.77 K/UL (ref 0.3–1)
ABSOLUTE NEUTROPHIL COUNT (OHS): 7.1 K/UL (ref 1.8–7.7)
ALBUMIN SERPL BCP-MCNC: 3.6 G/DL (ref 3.5–5.2)
ALP SERPL-CCNC: 276 UNIT/L (ref 40–150)
ALT SERPL W/O P-5'-P-CCNC: 51 UNIT/L (ref 0–55)
ANION GAP (OHS): 8 MMOL/L (ref 8–16)
AST SERPL-CCNC: 50 UNIT/L (ref 0–50)
BASOPHILS # BLD AUTO: 0.03 K/UL
BASOPHILS NFR BLD AUTO: 0.3 %
BILIRUB SERPL-MCNC: 2.7 MG/DL (ref 0.1–1)
BUN SERPL-MCNC: 31 MG/DL (ref 8–23)
CALCIUM SERPL-MCNC: 9.3 MG/DL (ref 8.7–10.5)
CHLORIDE SERPL-SCNC: 102 MMOL/L (ref 95–110)
CO2 SERPL-SCNC: 26 MMOL/L (ref 23–29)
CREAT SERPL-MCNC: 0.7 MG/DL (ref 0.5–1.4)
ERYTHROCYTE [DISTWIDTH] IN BLOOD BY AUTOMATED COUNT: 15.9 % (ref 11.5–14.5)
FREEZE AND HOLD: NORMAL
GFR SERPLBLD CREATININE-BSD FMLA CKD-EPI: >60 ML/MIN/1.73/M2
GLUCOSE SERPL-MCNC: 103 MG/DL (ref 70–110)
HBV CORE AB SERPL QL IA: NORMAL
HBV SURFACE AB SER-ACNC: 26.12 MIU/ML
HBV SURFACE AB SERPL IA-ACNC: REACTIVE M[IU]/ML
HBV SURFACE AG SERPL QL IA: NORMAL
HCT VFR BLD AUTO: 38.9 % (ref 37–48.5)
HCV AB SERPL QL IA: NORMAL
HGB BLD-MCNC: 13.2 GM/DL (ref 12–16)
HIV 1+2 AB+HIV1 P24 AG SERPL QL IA: NORMAL
IMM GRANULOCYTES # BLD AUTO: 0.03 K/UL (ref 0–0.04)
IMM GRANULOCYTES NFR BLD AUTO: 0.3 % (ref 0–0.5)
INDIRECT COOMBS: NORMAL
INR PPP: 1.1 (ref 0.8–1.2)
LYMPHOCYTES # BLD AUTO: 0.99 K/UL (ref 1–4.8)
MCH RBC QN AUTO: 33.9 PG (ref 27–31)
MCHC RBC AUTO-ENTMCNC: 33.9 G/DL (ref 32–36)
MCV RBC AUTO: 100 FL (ref 82–98)
NUCLEATED RBC (/100WBC) (OHS): 0 /100 WBC
OHS QRS DURATION: 94 MS
OHS QTC CALCULATION: 616 MS
PLATELET # BLD AUTO: 244 K/UL (ref 150–450)
PMV BLD AUTO: 8.9 FL (ref 9.2–12.9)
POTASSIUM SERPL-SCNC: 3.6 MMOL/L (ref 3.5–5.1)
PROT SERPL-MCNC: 7.2 GM/DL (ref 6–8.4)
PROTHROMBIN TIME: 11.8 SECONDS (ref 9–12.5)
RBC # BLD AUTO: 3.89 M/UL (ref 4–5.4)
RELATIVE EOSINOPHIL (OHS): 0.3 %
RELATIVE LYMPHOCYTE (OHS): 11.1 % (ref 18–48)
RELATIVE MONOCYTE (OHS): 8.6 % (ref 4–15)
RELATIVE NEUTROPHIL (OHS): 79.4 % (ref 38–73)
RH BLD: NORMAL
SODIUM SERPL-SCNC: 136 MMOL/L (ref 136–145)
SPECIMEN OUTDATE: NORMAL
WBC # BLD AUTO: 8.95 K/UL (ref 3.9–12.7)

## 2025-08-13 PROCEDURE — G0378 HOSPITAL OBSERVATION PER HR: HCPCS | Mod: TXP

## 2025-08-13 PROCEDURE — 25000003 PHARM REV CODE 250: Mod: NTX | Performed by: PHYSICIAN ASSISTANT

## 2025-08-13 PROCEDURE — 86704 HEP B CORE ANTIBODY TOTAL: CPT | Mod: NTX | Performed by: PHYSICIAN ASSISTANT

## 2025-08-13 PROCEDURE — 87340 HEPATITIS B SURFACE AG IA: CPT | Mod: NTX | Performed by: PHYSICIAN ASSISTANT

## 2025-08-13 PROCEDURE — 86706 HEP B SURFACE ANTIBODY: CPT | Mod: NTX | Performed by: PHYSICIAN ASSISTANT

## 2025-08-13 PROCEDURE — 93010 ELECTROCARDIOGRAM REPORT: CPT | Mod: NTX,,, | Performed by: INTERNAL MEDICINE

## 2025-08-13 PROCEDURE — 86901 BLOOD TYPING SEROLOGIC RH(D): CPT | Mod: NTX | Performed by: PHYSICIAN ASSISTANT

## 2025-08-13 PROCEDURE — 93005 ELECTROCARDIOGRAM TRACING: CPT | Mod: NTX

## 2025-08-13 PROCEDURE — 87522 HEPATITIS C REVRS TRNSCRPJ: CPT | Mod: NTX | Performed by: PHYSICIAN ASSISTANT

## 2025-08-13 PROCEDURE — 85610 PROTHROMBIN TIME: CPT | Mod: NTX | Performed by: PHYSICIAN ASSISTANT

## 2025-08-13 PROCEDURE — 86803 HEPATITIS C AB TEST: CPT | Mod: NTX | Performed by: PHYSICIAN ASSISTANT

## 2025-08-13 PROCEDURE — 36415 COLL VENOUS BLD VENIPUNCTURE: CPT | Mod: TXP | Performed by: PHYSICIAN ASSISTANT

## 2025-08-13 PROCEDURE — 82435 ASSAY OF BLOOD CHLORIDE: CPT | Mod: NTX | Performed by: PHYSICIAN ASSISTANT

## 2025-08-13 PROCEDURE — 87389 HIV-1 AG W/HIV-1&-2 AB AG IA: CPT | Mod: NTX | Performed by: PHYSICIAN ASSISTANT

## 2025-08-13 PROCEDURE — 85025 COMPLETE CBC W/AUTO DIFF WBC: CPT | Mod: NTX | Performed by: PHYSICIAN ASSISTANT

## 2025-08-13 RX ORDER — HYDROCODONE BITARTRATE AND ACETAMINOPHEN 500; 5 MG/1; MG/1
TABLET ORAL
Status: DISCONTINUED | OUTPATIENT
Start: 2025-08-13 | End: 2025-08-14 | Stop reason: HOSPADM

## 2025-08-13 RX ORDER — DIPHENHYDRAMINE HCL 25 MG
25 CAPSULE ORAL ONCE
Status: COMPLETED | OUTPATIENT
Start: 2025-08-13 | End: 2025-08-13

## 2025-08-13 RX ORDER — SPIRONOLACTONE 50 MG/1
150 TABLET, FILM COATED ORAL DAILY
Status: DISCONTINUED | OUTPATIENT
Start: 2025-08-14 | End: 2025-08-14 | Stop reason: HOSPADM

## 2025-08-13 RX ORDER — MUPIROCIN 20 MG/G
OINTMENT TOPICAL
Status: DISCONTINUED | OUTPATIENT
Start: 2025-08-13 | End: 2025-08-13

## 2025-08-13 RX ORDER — ALBUTEROL SULFATE 90 UG/1
2 INHALANT RESPIRATORY (INHALATION) EVERY 4 HOURS PRN
Status: DISCONTINUED | OUTPATIENT
Start: 2025-08-13 | End: 2025-08-14 | Stop reason: HOSPADM

## 2025-08-13 RX ORDER — FAMOTIDINE 20 MG/1
40 TABLET, FILM COATED ORAL DAILY
Status: DISCONTINUED | OUTPATIENT
Start: 2025-08-14 | End: 2025-08-14 | Stop reason: HOSPADM

## 2025-08-13 RX ORDER — FUROSEMIDE 20 MG/1
20 TABLET ORAL DAILY
Status: DISCONTINUED | OUTPATIENT
Start: 2025-08-14 | End: 2025-08-14 | Stop reason: HOSPADM

## 2025-08-13 RX ORDER — METHYLPREDNISOLONE SODIUM SUCCINATE 500 MG/8ML
500 INJECTION INTRAMUSCULAR; INTRAVENOUS
Status: DISCONTINUED | OUTPATIENT
Start: 2025-08-13 | End: 2025-08-13

## 2025-08-13 RX ORDER — POTASSIUM CHLORIDE 20 MEQ/1
20 TABLET, EXTENDED RELEASE ORAL DAILY
Status: DISCONTINUED | OUTPATIENT
Start: 2025-08-14 | End: 2025-08-14 | Stop reason: HOSPADM

## 2025-08-13 RX ADMIN — DIPHENHYDRAMINE HYDROCHLORIDE 25 MG: 25 CAPSULE ORAL at 10:08

## 2025-08-14 VITALS
DIASTOLIC BLOOD PRESSURE: 59 MMHG | BODY MASS INDEX: 16.49 KG/M2 | HEART RATE: 95 BPM | SYSTOLIC BLOOD PRESSURE: 94 MMHG | RESPIRATION RATE: 16 BRPM | TEMPERATURE: 98 F | HEIGHT: 63 IN | WEIGHT: 93.06 LBS | OXYGEN SATURATION: 96 %

## 2025-08-14 PROBLEM — Z01.818 PRE-OP EXAM: Status: ACTIVE | Noted: 2025-08-14

## 2025-08-14 LAB
ALBUMIN FLD-MCNC: 1.2 G/DL
APPEARANCE FLD: CLEAR
COLOR FLD: YELLOW
HCV RNA SERPL NAA+PROBE-LOG IU: NOT DETECTED {LOG_IU}/ML
LDH FLD L TO P-CCNC: 39 U/L
LYMPHOCYTES NFR FLD MANUAL: 55 %
MESOTHL CELL NFR FLD MANUAL: 7 %
MONOS+MACROS NFR FLD MANUAL: 18 %
NEUTROPHILS NFR FLD MANUAL: 20 %
PROT FLD-MCNC: 2 G/DL
WBC # FLD: 63 /CU MM

## 2025-08-14 PROCEDURE — 25000003 PHARM REV CODE 250: Mod: NTX

## 2025-08-14 PROCEDURE — P9047 ALBUMIN (HUMAN), 25%, 50ML: HCPCS | Mod: NTX | Performed by: INTERNAL MEDICINE

## 2025-08-14 PROCEDURE — 89051 BODY FLUID CELL COUNT: CPT | Mod: NTX

## 2025-08-14 PROCEDURE — 63600175 PHARM REV CODE 636 W HCPCS: Mod: NTX | Performed by: INTERNAL MEDICINE

## 2025-08-14 PROCEDURE — 99499 UNLISTED E&M SERVICE: CPT | Mod: NTX,,, | Performed by: INTERNAL MEDICINE

## 2025-08-14 PROCEDURE — 83615 LACTATE (LD) (LDH) ENZYME: CPT | Mod: NTX

## 2025-08-14 PROCEDURE — 63600175 PHARM REV CODE 636 W HCPCS: Mod: NTX

## 2025-08-14 PROCEDURE — G0378 HOSPITAL OBSERVATION PER HR: HCPCS | Mod: NTX

## 2025-08-14 PROCEDURE — 87075 CULTR BACTERIA EXCEPT BLOOD: CPT | Mod: NTX

## 2025-08-14 PROCEDURE — 99239 HOSP IP/OBS DSCHRG MGMT >30: CPT | Mod: NTX,,,

## 2025-08-14 PROCEDURE — 87205 SMEAR GRAM STAIN: CPT | Mod: NTX

## 2025-08-14 PROCEDURE — 84157 ASSAY OF PROTEIN OTHER: CPT | Mod: NTX

## 2025-08-14 PROCEDURE — 87070 CULTURE OTHR SPECIMN AEROBIC: CPT | Mod: NTX

## 2025-08-14 PROCEDURE — 82042 OTHER SOURCE ALBUMIN QUAN EA: CPT | Mod: NTX

## 2025-08-14 RX ORDER — ALBUMIN HUMAN 250 G/1000ML
SOLUTION INTRAVENOUS
Status: COMPLETED | OUTPATIENT
Start: 2025-08-14 | End: 2025-08-14

## 2025-08-14 RX ORDER — LIDOCAINE HYDROCHLORIDE 10 MG/ML
INJECTION, SOLUTION INFILTRATION; PERINEURAL
Status: COMPLETED | OUTPATIENT
Start: 2025-08-14 | End: 2025-08-14

## 2025-08-14 RX ADMIN — POTASSIUM CHLORIDE 20 MEQ: 1500 TABLET, EXTENDED RELEASE ORAL at 09:08

## 2025-08-14 RX ADMIN — FAMOTIDINE 40 MG: 20 TABLET, FILM COATED ORAL at 09:08

## 2025-08-14 RX ADMIN — ALBUMIN (HUMAN) 37.5 G: 12.5 SOLUTION INTRAVENOUS at 03:08

## 2025-08-14 RX ADMIN — LIDOCAINE HYDROCHLORIDE 3 ML: 10 INJECTION, SOLUTION INFILTRATION; PERINEURAL at 02:08

## 2025-08-14 RX ADMIN — FUROSEMIDE 20 MG: 20 TABLET ORAL at 09:08

## 2025-08-14 RX ADMIN — SPIRONOLACTONE 150 MG: 50 TABLET ORAL at 09:08

## 2025-08-15 LAB
GRAM STN SPEC: NORMAL
GRAM STN SPEC: NORMAL

## 2025-08-17 LAB
FFP: NORMAL
RBCS: NORMAL

## 2025-08-18 ENCOUNTER — TELEPHONE (OUTPATIENT)
Dept: TRANSPLANT | Facility: CLINIC | Age: 65
End: 2025-08-18
Payer: COMMERCIAL

## 2025-08-18 PROBLEM — Z01.818 PRE-OP EXAM: Status: RESOLVED | Noted: 2025-08-14 | Resolved: 2025-08-18

## 2025-08-18 LAB
B-LACTAMASE USUAL SUSC ISLT: NEGATIVE
BACTERIA SPEC AEROBE CULT: ABNORMAL

## 2025-08-21 ENCOUNTER — TELEPHONE (OUTPATIENT)
Dept: TRANSPLANT | Facility: CLINIC | Age: 65
End: 2025-08-21
Payer: COMMERCIAL

## 2025-08-21 ENCOUNTER — PATIENT MESSAGE (OUTPATIENT)
Dept: TRANSPLANT | Facility: CLINIC | Age: 65
End: 2025-08-21
Payer: COMMERCIAL

## 2025-08-22 LAB — BACTERIA SPEC ANAEROBE CULT: NORMAL
